# Patient Record
Sex: MALE | Race: WHITE | NOT HISPANIC OR LATINO | Employment: OTHER | URBAN - METROPOLITAN AREA
[De-identification: names, ages, dates, MRNs, and addresses within clinical notes are randomized per-mention and may not be internally consistent; named-entity substitution may affect disease eponyms.]

---

## 2021-10-30 RX ORDER — CETIRIZINE HYDROCHLORIDE 10 MG/1
TABLET ORAL
Qty: 90 TABLET | Refills: 1 | Status: SHIPPED | OUTPATIENT
Start: 2021-10-30

## 2021-10-30 RX ORDER — BLOOD SUGAR DIAGNOSTIC
STRIP MISCELLANEOUS
Qty: 200 EACH | Refills: 2 | Status: SHIPPED | OUTPATIENT
Start: 2021-10-30

## 2022-06-01 RX ORDER — AMIODARONE HYDROCHLORIDE 200 MG/1
TABLET ORAL
Qty: 90 TABLET | OUTPATIENT
Start: 2022-06-01

## 2022-11-22 ENCOUNTER — TELEPHONE (OUTPATIENT)
Dept: RADIATION ONCOLOGY | Facility: HOSPITAL | Age: 87
End: 2022-11-22

## 2022-11-28 ENCOUNTER — CONSULT (OUTPATIENT)
Dept: ONCOLOGY | Facility: CLINIC | Age: 87
End: 2022-11-28

## 2022-11-28 VITALS
TEMPERATURE: 96.9 F | HEART RATE: 112 BPM | BODY MASS INDEX: 29.4 KG/M2 | SYSTOLIC BLOOD PRESSURE: 128 MMHG | HEIGHT: 71 IN | WEIGHT: 210 LBS | DIASTOLIC BLOOD PRESSURE: 79 MMHG | OXYGEN SATURATION: 93 % | RESPIRATION RATE: 18 BRPM

## 2022-11-28 DIAGNOSIS — C44.1192 BASAL CELL CARCINOMA (BCC) OF LEFT LOWER EYELID: Primary | ICD-10-CM

## 2022-11-28 PROCEDURE — 99204 OFFICE O/P NEW MOD 45 MIN: CPT | Performed by: INTERNAL MEDICINE

## 2022-11-28 RX ORDER — TORSEMIDE 20 MG/1
TABLET ORAL
COMMUNITY

## 2022-11-28 RX ORDER — FLUOROMETHOLONE 0.1 %
SUSPENSION, DROPS(FINAL DOSAGE FORM)(ML) OPHTHALMIC (EYE)
COMMUNITY

## 2022-11-28 RX ORDER — CETIRIZINE HYDROCHLORIDE 10 MG/1
TABLET ORAL
COMMUNITY
End: 2022-11-30 | Stop reason: SDUPTHER

## 2022-11-28 RX ORDER — LISINOPRIL 10 MG/1
TABLET ORAL
COMMUNITY
Start: 2022-10-03

## 2022-11-28 RX ORDER — DOXYCYCLINE HYCLATE 100 MG/1
CAPSULE ORAL
COMMUNITY
Start: 2022-11-18 | End: 2022-11-30

## 2022-11-28 RX ORDER — FLUTICASONE PROPIONATE 50 MCG
SPRAY, SUSPENSION (ML) NASAL
COMMUNITY

## 2022-11-28 RX ORDER — LEVOTHYROXINE SODIUM 0.03 MG/1
TABLET ORAL
COMMUNITY
Start: 2022-09-27

## 2022-11-28 RX ORDER — APIXABAN 2.5 MG/1
TABLET, FILM COATED ORAL
COMMUNITY
Start: 2022-09-27

## 2022-11-28 RX ORDER — OMEPRAZOLE 20 MG/1
CAPSULE, DELAYED RELEASE ORAL
COMMUNITY
Start: 2022-11-22

## 2022-11-28 RX ORDER — CLINDAMYCIN HYDROCHLORIDE 150 MG/1
CAPSULE ORAL
COMMUNITY
End: 2022-11-30

## 2022-11-28 RX ORDER — ISOPROPYL ALCOHOL 70 ML/100ML
SWAB TOPICAL
COMMUNITY
Start: 2022-10-11

## 2022-11-28 RX ORDER — SACUBITRIL AND VALSARTAN 24; 26 MG/1; MG/1
TABLET, FILM COATED ORAL
COMMUNITY

## 2022-11-28 RX ORDER — FLUOROURACIL 50 MG/G
CREAM TOPICAL
COMMUNITY

## 2022-11-28 RX ORDER — CEPHALEXIN 500 MG/1
CAPSULE ORAL
COMMUNITY

## 2022-11-28 RX ORDER — ALBUTEROL SULFATE 90 UG/1
AEROSOL, METERED RESPIRATORY (INHALATION)
COMMUNITY
Start: 2022-11-18

## 2022-11-28 RX ORDER — SIMVASTATIN 40 MG
TABLET ORAL
COMMUNITY
Start: 2022-10-04

## 2022-11-28 RX ORDER — POLYETHYLENE GLYCOL 3350, SODIUM CHLORIDE, SODIUM BICARBONATE, POTASSIUM CHLORIDE 420; 11.2; 5.72; 1.48 G/4L; G/4L; G/4L; G/4L
POWDER, FOR SOLUTION ORAL
COMMUNITY

## 2022-11-28 RX ORDER — CLOCORTOLONE PIVALATE 0 G/G
CREAM TOPICAL
COMMUNITY

## 2022-11-28 RX ORDER — CARVEDILOL 25 MG/1
12.5 TABLET ORAL 2 TIMES DAILY WITH MEALS
COMMUNITY
Start: 2022-10-13

## 2022-11-28 RX ORDER — CEFDINIR 300 MG/1
CAPSULE ORAL
COMMUNITY
Start: 2022-11-22

## 2022-11-28 RX ORDER — BIMATOPROST 0.01 %
DROPS OPHTHALMIC (EYE)
COMMUNITY

## 2022-11-28 RX ORDER — MOLNUPIRAVIR 200 MG/1
CAPSULE ORAL
COMMUNITY

## 2022-11-28 RX ORDER — LEVOTHYROXINE SODIUM 0.2 MG/1
TABLET ORAL
COMMUNITY
Start: 2022-11-15

## 2022-11-28 RX ORDER — HYDROCODONE BITARTRATE AND ACETAMINOPHEN 5; 325 MG/1; MG/1
TABLET ORAL
COMMUNITY
Start: 2022-09-13

## 2022-11-28 RX ORDER — BUDESONIDE AND FORMOTEROL FUMARATE DIHYDRATE 160; 4.5 UG/1; UG/1
AEROSOL RESPIRATORY (INHALATION)
COMMUNITY
Start: 2022-09-27

## 2022-11-28 RX ORDER — LORATADINE 10 MG/1
TABLET ORAL
COMMUNITY

## 2022-11-28 RX ORDER — OFLOXACIN 3 MG/ML
SOLUTION/ DROPS OPHTHALMIC
COMMUNITY

## 2022-11-28 RX ORDER — AMIODARONE HYDROCHLORIDE 200 MG/1
TABLET ORAL
COMMUNITY
Start: 2022-10-31

## 2022-11-28 RX ORDER — GLIMEPIRIDE 2 MG/1
TABLET ORAL
COMMUNITY
Start: 2022-08-29

## 2022-11-28 RX ORDER — GLIPIZIDE 5 MG/1
TABLET ORAL
COMMUNITY
Start: 2022-10-21

## 2022-11-28 RX ORDER — FUROSEMIDE 40 MG/1
TABLET ORAL
COMMUNITY
Start: 2022-11-15

## 2022-11-28 RX ORDER — DIFLUPREDNATE OPHTHALMIC 0.5 MG/ML
EMULSION OPHTHALMIC
COMMUNITY

## 2022-11-28 RX ORDER — LEVOTHYROXINE SODIUM 175 UG/1
TABLET ORAL
COMMUNITY
Start: 2022-11-21 | End: 2022-11-30

## 2022-11-28 RX ORDER — POTASSIUM CHLORIDE 750 MG/1
TABLET, EXTENDED RELEASE ORAL
COMMUNITY
Start: 2022-10-11

## 2022-11-28 NOTE — PROGRESS NOTES
DATE OF CONSULTATION: 11/28/2022    REFERRING PHYSICIAN: Hazel Marinelli MD    Dear Hazel Villarreal MD  Thank you for asking for my medical advice on this patient. I saw him in the  [place] office on 11/28/2022    REASON FOR CONSULTATION: Skin cancers    PROBLEM LIST:   1.  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  Basal cell carcinoma left lower eyelid,  A.  Status post biopsy September 2022  3.  History of prostate cancer:  A.  Treated with pelvic radiation  4.  Remote history of thyroid cancer:  A.  Treated with thyroidectomy followed by radioactive iodine at [facility]  B.  On chronic Synthroid replacement  5.  Atrial fibrillation:  A.  Status post pacemaker  B.  On amiodarone    HISTORY OF PRESENT ILLNESS: The patient is a very pleasant 87 y.o.  male   who was in his usual state of health until September 2022.  The patient presented with a growing lesion left lower eyelid.  He had a biopsy done that confirmed basal cell carcinoma.  The patient had previously squamous cell carcinoma that was removed from left ear.  He has met with Dr. Troy in radiation oncology who recommended 30 to radiation treatments to left side of face.  He was referred to me for further recommendations.    SUBJECTIVE: When I saw the patient today he is here with his wife.  The left lower eyelid lesion had decreased significantly in size since the biopsy.  He is anxious about today's visit.    Review of Systems   Constitutional: Positive for fatigue.   Respiratory: Negative.    Cardiovascular: Negative.    Gastrointestinal: Negative.    Genitourinary: Negative.    Skin: Positive for rash.   Neurological: Negative.    Hematological: Negative.    Psychiatric/Behavioral: The patient is nervous/anxious.        No past medical history on file.    Social History     Socioeconomic History   • Marital status:        No family history on file.    No past surgical history on file.    Allergies    Allergen Reactions   • Penicillins Unknown - Low Severity          Current Outpatient Medications:   •  Accu-Chek Guide test strip, TEST BLOOD SUGAR TWICE DAILY, Disp: 200 each, Rfl: 2  •  albuterol sulfate  (90 Base) MCG/ACT inhaler, , Disp: , Rfl:   •  Alcohol Swabs (DropSafe Alcohol Prep) 70 % pads, , Disp: , Rfl:   •  amiodarone (PACERONE) 200 MG tablet, , Disp: , Rfl:   •  bimatoprost (Lumigan) 0.01 % ophthalmic drops, Lumigan 0.01 % eye drops, Disp: , Rfl:   •  carvedilol (COREG) 25 MG tablet, , Disp: , Rfl:   •  cefdinir (OMNICEF) 300 MG capsule, , Disp: , Rfl:   •  cephalexin (KEFLEX) 500 MG capsule, cephalexin 500 mg capsule, Disp: , Rfl:   •  cetirizine (zyrTEC) 10 MG tablet, TAKE 1 TABLET EVERY DAY, Disp: 90 tablet, Rfl: 1  •  cetirizine (zyrTEC) 10 MG tablet, cetirizine 10 mg tablet  Take 1 tablet every day by oral route., Disp: , Rfl:   •  clindamycin (CLEOCIN) 150 MG capsule, clindamycin HCl 150 mg capsule, Disp: , Rfl:   •  Clocortolone Pivalate 0.1 % cream, clocortolone pivalate 0.1 % topical cream, Disp: , Rfl:   •  Diclofenac Sodium (Voltaren) 1 % gel gel, Voltaren 1 % topical gel, Disp: , Rfl:   •  difluprednate (Durezol) 0.05 % ophthalmic emulsion, Durezol 0.05 % eye drops, Disp: , Rfl:   •  doxycycline (VIBRAMYCIN) 100 MG capsule, , Disp: , Rfl:   •  Eliquis 2.5 MG tablet tablet, , Disp: , Rfl:   •  fluorometholone (FML) 0.1 % ophthalmic suspension, fluorometholone 0.1 % eye drops,suspension, Disp: , Rfl:   •  fluorouracil (EFUDEX) 5 % cream, fluorouracil 5 % topical cream, Disp: , Rfl:   •  fluticasone (FLONASE) 50 MCG/ACT nasal spray, fluticasone propionate 50 mcg/actuation nasal spray,suspension, Disp: , Rfl:   •  furosemide (LASIX) 40 MG tablet, , Disp: , Rfl:   •  glimepiride (AMARYL) 2 MG tablet, , Disp: , Rfl:   •  glipizide (GLUCOTROL) 5 MG tablet, , Disp: , Rfl:   •  HYDROcodone-acetaminophen (NORCO) 5-325 MG per tablet, , Disp: , Rfl:   •  levothyroxine (SYNTHROID, LEVOTHROID)  "175 MCG tablet, , Disp: , Rfl:   •  levothyroxine (SYNTHROID, LEVOTHROID) 200 MCG tablet, , Disp: , Rfl:   •  levothyroxine (SYNTHROID, LEVOTHROID) 25 MCG tablet, , Disp: , Rfl:   •  lisinopril (PRINIVIL,ZESTRIL) 10 MG tablet, , Disp: , Rfl:   •  loratadine (Claritin) 10 MG tablet, Claritin 10 mg tablet  Take 1 tablet every day by oral route as needed., Disp: , Rfl:   •  metFORMIN (GLUCOPHAGE) 1000 MG tablet, , Disp: , Rfl:   •  Molnupiravir (Lagevrio) 200 MG capsule, Lagevrio 200 mg capsule (EUA), Disp: , Rfl:   •  mupirocin (BACTROBAN) 2 % ointment, , Disp: , Rfl:   •  ofloxacin (OCUFLOX) 0.3 % ophthalmic solution, ofloxacin 0.3 % eye drops, Disp: , Rfl:   •  omeprazole (priLOSEC) 20 MG capsule, , Disp: , Rfl:   •  polyethylene glycol-electrolytes (TriLyte) 420 g solution, TriLyte With Flavor Packets 420 gram oral solution, Disp: , Rfl:   •  potassium chloride (K-DUR,KLOR-CON) 10 MEQ CR tablet, , Disp: , Rfl:   •  sacubitril-valsartan (Entresto) 24-26 MG tablet, Entresto 24 mg-26 mg tablet, Disp: , Rfl:   •  simvastatin (ZOCOR) 40 MG tablet, , Disp: , Rfl:   •  Symbicort 160-4.5 MCG/ACT inhaler, , Disp: , Rfl:   •  torsemide (DEMADEX) 20 MG tablet, torsemide 20 mg tablet, Disp: , Rfl:     PHYSICAL EXAMINATION:   /79   Pulse 112   Temp 96.9 °F (36.1 °C) (Temporal)   Resp 18   Ht 180.3 cm (71\")   Wt 95.3 kg (210 lb)   SpO2 93%   BMI 29.29 kg/m²   Pain Score    11/28/22 1053   PainSc: 0-No pain      ECOG score: 2            ECOG Performance Status: 1 - Symptomatic but completely ambulatory  General Appearance:  alert, cooperative, no apparent distress and appears stated age   Neurologic/Psychiatric: A&O x 3, gait steady, appropriate affect, strength 5/5 in all muscle groups   HEENT:  Normocephalic, without obvious abnormality, mucous membranes moist   Neck: Supple, symmetrical, trachea midline, no adenopathy;  No thyromegaly, masses, or tenderness   Lungs:   Clear to auscultation bilaterally; " respirations regular, even, and unlabored bilaterally   Heart:  Regular rate and rhythm, no murmurs appreciated   Abdomen:   Soft, non-tender, non-distended and no organomegaly   Lymph nodes: No cervical, supraclavicular, inguinal or axillary adenopathy noted   Extremities: Normal, atraumatic; no clubbing, cyanosis, or edema    Skin: No rashes, ulcers, or suspicious lesions noted       No visits with results within 2 Week(s) from this visit.   Latest known visit with results is:   No results found for any previous visit.        No results found.      DIAGNOSTIC DATA:   1.  Extensive patient medical records including doctor notes blood work results and imaging report reviewed by me and document the patient's chart.    ASSESSMENT: The patient is a very pleasant 87 y.o.  male  with skin cancers    PLAN:     1.  Skin cancers:  A.  I had a long discussion today with the patient and his wife about his  new diagnosis of skin cancers. I did go over the final pathology report in  detail with both of them. I reviewed the patient's documents including refereing provider's notes, lab results, imaging, and path report.  B.  I explained to the patient that I am really not that impressed with his current residual disease.  His squamous cell carcinoma has been removed from the left ear.  He still has evidence of disease in the left lower eyelid biopsy-proven basal cell carcinoma but this area is fairly small currently.  C.  I recommend to proceed with definitive radiotherapy to the left lower eyelid lesion with focal and targeted treatment.  I do think patient needs to have radiation to the whole left side of face.  D.  I discussed the case with my radiation oncology colleague Dr. Day who kindly agreed see the patient on Wednesday.  E.  Future treatment options might include immunotherapy with Libtayo 350 mg IV every 3 weeks if he has evidence of recurrent disease that is not amenable to resection or radiation.  F.  I did go  over his CT neck and reassured him no evidence of metastases.  G.  He will follow-up with me in 3 months.  H.  He will continue routine follow-up visit with dermatology.    2.  Atrial fibrillation:  A.  The patient status post pacemaker placement.  He is also on amiodarone.    FOLLOW UP: 3 months.    Aleksandra Rivera MD  11/28/2022

## 2022-11-29 ENCOUNTER — TELEPHONE (OUTPATIENT)
Dept: RADIATION ONCOLOGY | Facility: HOSPITAL | Age: 87
End: 2022-11-29

## 2022-11-30 ENCOUNTER — OFFICE VISIT (OUTPATIENT)
Dept: RADIATION ONCOLOGY | Facility: HOSPITAL | Age: 87
End: 2022-11-30

## 2022-11-30 ENCOUNTER — HOSPITAL ENCOUNTER (OUTPATIENT)
Dept: RADIATION ONCOLOGY | Facility: HOSPITAL | Age: 87
Setting detail: RADIATION/ONCOLOGY SERIES
Discharge: HOME | End: 2022-11-30

## 2022-11-30 VITALS
HEART RATE: 93 BPM | HEIGHT: 71 IN | RESPIRATION RATE: 18 BRPM | TEMPERATURE: 96.8 F | DIASTOLIC BLOOD PRESSURE: 89 MMHG | BODY MASS INDEX: 29.82 KG/M2 | WEIGHT: 213 LBS | OXYGEN SATURATION: 92 % | SYSTOLIC BLOOD PRESSURE: 143 MMHG

## 2022-11-30 DIAGNOSIS — C44.1292 SQUAMOUS CELL CARCINOMA (SCC) OF LOWER EYELID OF LEFT EYE: Primary | ICD-10-CM

## 2022-11-30 PROBLEM — C44.229: Status: ACTIVE | Noted: 2022-11-30

## 2022-11-30 PROBLEM — C44.92 SQUAMOUS CELL CARCINOMA OF SKIN: Status: ACTIVE | Noted: 2022-11-30

## 2022-11-30 PROBLEM — C44.1192 BASAL CELL CARCINOMA (BCC) OF LEFT LOWER EYELID: Status: RESOLVED | Noted: 2022-11-28 | Resolved: 2022-11-30

## 2022-11-30 PROBLEM — C44.92 SQUAMOUS CELL CARCINOMA OF SKIN: Status: RESOLVED | Noted: 2022-11-30 | Resolved: 2022-11-30

## 2022-11-30 PROCEDURE — G0463 HOSPITAL OUTPT CLINIC VISIT: HCPCS

## 2022-11-30 NOTE — PROGRESS NOTES
CONSULTATION NOTE      :                                                          1935  DATE OF CONSULTATION:                       2022   REQUESTING PHYSICIAN:                   Rachel Kaur MD  REASON FOR CONSULTATION:             Squamous cell carcinoma (BCC) of left lower eyelid  - Stage IA (cT1, cN0, cM0)    Squamous Cell Carcinoma, left lower earlobe  - Stage T3N0M0     BRIEF HISTORY:  The patient is a very pleasant 87 y.o. male  with a past medical history significant for coronary artery disease as well as a remote history of prostate cancer treated with primary radiation, as well as multiple cutaneous skin cancers.  He recently presented with an enlarging mass involving the inferior aspect of his left earlobe as well as skin below his ear.  He had a two-stage Mohs resection which identified an approximate 4 cm poorly differentiated squamous cell carcinoma that had evidence of perineural invasion involving a nerve that measured 0.04 mm within the dermis.  There was perineural invasion.  Margins were negative after 2 stages, and he was able to have a Mercedes flap reconstruction.  In addition, he has 2 more cutaneous lesions that he reports involving left inferior eyelid and the right upper lip.  He was referred by Dr. Kaur for adjuvant radiation therapy.  He was initially seen by Dr. Troy at [facility] radiation oncology, who recommended a protracted treatment course of 38 fractions.  He was also seen by Dr. Rivera who did not recommend any systemic therapy. I am seeing him today specifically to discuss adjuvant radiation therapy as a second opinion.    Allergy:   Allergies   Allergen Reactions   • Penicillins Hives       Social History:   Social History     Socioeconomic History   • Marital status:    Tobacco Use   • Smoking status: Former     Types: Cigarettes   • Tobacco comments:     Quit age 26   Substance and Sexual Activity   • Alcohol use: Not Currently   • Drug  "use: Never       Past Medical History:   Past Medical History:   Diagnosis Date   • CAD (coronary artery disease)     3 stents   • Cardiac arrest (HCC)    • Hypertension    • Pacemaker    • Prostate cancer (HCC)    • Skin cancer of face    • Thyroid cancer (HCC)        Family History: family history includes Breast cancer in his sister; Colon cancer in his mother; Heart disease in his father.     Surgical History:   Past Surgical History:   Procedure Laterality Date   • CARDIAC VALVE REPLACEMENT      porcine   • CORONARY ARTERY BYPASS GRAFT      X 2   • OTHER SURGICAL HISTORY      bladder tumor removed   • STOMACH SURGERY      bleed-ulcer   • THYROIDECTOMY          Review of Systems:   Review of Systems   Respiratory: Positive for shortness of breath (with exertion).    All other systems reviewed and are negative.          Objective   VITAL SIGNS:   Vitals:    11/30/22 1421   BP: 143/89   Pulse: 93   Resp: 18   Temp: 96.8 °F (36 °C)   TempSrc: Temporal   SpO2: 92%   Weight: 96.6 kg (213 lb)   Height: 180.3 cm (71\")   PainSc: 0-No pain        Karnofsky score: 80       Physical Exam:   Physical Exam  Vitals and nursing note reviewed.   Constitutional:       General: He is not in acute distress.     Appearance: He is well-developed.   HENT:      Head: Normocephalic and atraumatic.      Comments: Extensive cutaneous changes consistent with sun damaged throughout the scalp and face.  There are multiple previous excision sites he has a 5 x 7 mm erythematous plaque-like lesion in the left inferior lateral aspect of the periocular fat, consistent with a cutaneous skin cancer this has previously been biopsied as a squamous cell carcinoma.    There is no current ulceration.      There is an additional 4x6mm elliptical erythematous plaque on the right upper lip consistent with a squamous cell carcinoma.    There is an elliptical incision below and anterior to the left ear lobe that is well healed.  There is no abnormal " nodularity or adenopathy.  Parotid is soft and nontender.  No evidence of pre- or post- auricular adenopathy.  See images below consisting of the patient's personal image on the left that was immediately after Moh's resection, and then the image on the right is today.  The lesion under the left eyelid is indicated by the arrow.  Eyes:      Conjunctiva/sclera: Conjunctivae normal.      Pupils: Pupils are equal, round, and reactive to light.   Cardiovascular:      Rate and Rhythm: Normal rate and regular rhythm.      Heart sounds: No murmur heard.    No friction rub.   Pulmonary:      Effort: Pulmonary effort is normal.      Breath sounds: Normal breath sounds. No wheezing.   Abdominal:      General: Bowel sounds are normal. There is no distension.      Palpations: Abdomen is soft. There is no mass.      Tenderness: There is no abdominal tenderness.   Musculoskeletal:         General: Normal range of motion.      Cervical back: Normal range of motion and neck supple.   Lymphadenopathy:      Cervical: No cervical adenopathy.   Skin:     General: Skin is warm and dry.   Neurological:      Mental Status: He is alert and oriented to person, place, and time.   Psychiatric:         Behavior: Behavior normal.         Thought Content: Thought content normal.         Judgment: Judgment normal.            PATHOLOGY  Left Ear Resection 9/13/2022:  Poorly differentiated squamous cell carcinoma, defect 4x3 cm, perineural invasion involving a 0.04mm nerve in the dermis.    IMAGING  I have personally reviewed the relevant imaging studies, as follows:  Ct Neck 10/5/2022:  There are no masses or adenopathy in the neck.  The thyroid is not identified.  Is status post median sternotomy.  Marked degenerative disc disease.       The following portions of the patient's history were reviewed and updated as appropriate: allergies, current medications, past family history, past medical history, past social history, past surgical history and  problem list.    Assessment:   Assessment  Mr. Frank is an 87-year-old gentleman with a longstanding history of multiple cutaneous skin cancers, who now presents essentially for 2 separate skin cancers involving the left lower eyelid as well as the left ear.  He was able to undergo Mohs resection to the left earlobe, but unfortunately this has multiple high risk features including poorly differentiated histology, size greater than 2 cm, and perineural invasion with a measurable nerve within the adipose tissue, all of which will place him at significant risk for recurrence.  He will certainly require adjuvant radiation therapy to the resection bed, and I think we can also treat the left lower eyelid lesion from concurrently.  He was very interested in pursuing a hypofractionated treatment course, which I think we can certainly accomplish.  My plan will be to treat him using superficial electrons to approximately dose of 40-50 Gray over 4 weeks.  I will treat him in 2-3 fractions a week which should be able to minimize his risk for toxicity and also help limit the total requirements for transportation.  He was very pleased with this recommendation. I will coordinate for him to return to my clinic next week for a radiation setup and simulation session and we will likely begin treatments shortly thereafter pending insurance authorization.    RECOMMENDATIONS:    1. Plan to treat the left ear resection bed to 40-50 Gy over 12-15 treatments, with additional coverage of the left lower eyelid lesion concurrently    I spent a total of 60 minutes on todays visit, with more than 45 minutes in direct face to face communication, and the remainder of the time spent in reviewing the relevant history, records, available imaging, and for documentation.    Follow Up:   Return in about 1 week (around 12/7/2022) for Radiation Simulation.  Diagnoses and all orders for this visit:    1. Squamous cell carcinoma (SCC) of lower eyelid of  left eye (Primary)    Thank you for allowing me to participate in the care of this individual.    Sincerely,       Aaron Day MD     ADDENDUM 3/7/2023:  Special dosimetry using nanodots will be necessary during the treatment planning process for Mr. Frnak.  As can be seen in the images above, there are actually 2 lesions on his left face, which are in very close proximity to the left eye and orbit.  Nanodots will be used in this case to verify the treatment dose under the bolus, and also to determine that there is appropriate shielding in terms of protecting his eye.  This will not be able to be satisfactorily completed using the treatment planning system alone, so nanodots will be ordered on the first day of treatment.

## 2022-12-07 ENCOUNTER — HOSPITAL ENCOUNTER (OUTPATIENT)
Dept: RADIATION ONCOLOGY | Facility: HOSPITAL | Age: 87
Discharge: HOME | End: 2022-12-07

## 2022-12-07 ENCOUNTER — HOSPITAL ENCOUNTER (OUTPATIENT)
Dept: RADIATION ONCOLOGY | Facility: HOSPITAL | Age: 87
Setting detail: RADIATION/ONCOLOGY SERIES
Discharge: HOME | End: 2022-12-07
Payer: MEDICARE

## 2022-12-07 PROCEDURE — 77470 SPECIAL RADIATION TREATMENT: CPT | Performed by: RADIOLOGY

## 2022-12-07 PROCEDURE — 77334 RADIATION TREATMENT AID(S): CPT | Performed by: RADIOLOGY

## 2022-12-07 PROCEDURE — 77290 THER RAD SIMULAJ FIELD CPLX: CPT | Performed by: RADIOLOGY

## 2022-12-14 PROCEDURE — 77307 TELETHX ISODOSE PLAN CPLX: CPT | Performed by: RADIOLOGY

## 2022-12-16 ENCOUNTER — TELEPHONE (OUTPATIENT)
Dept: RADIATION ONCOLOGY | Facility: HOSPITAL | Age: 87
End: 2022-12-16

## 2022-12-16 PROCEDURE — 77321 SPECIAL TELETX PORT PLAN: CPT | Performed by: RADIOLOGY

## 2022-12-16 NOTE — TELEPHONE ENCOUNTER
Contacted [facility] PPM repLola Gtz to set up PPM interrogation prior to initial radiation treatment per pt cardiologist request ( Dr. Goel).Pt notified to arrive in rad onc Monday 12/19/22 @ 0900 to have PPM checked prior to treatment-verbalized understanding

## 2022-12-19 ENCOUNTER — HOSPITAL ENCOUNTER (OUTPATIENT)
Dept: RADIATION ONCOLOGY | Facility: HOSPITAL | Age: 87
Discharge: HOME | End: 2022-12-19

## 2022-12-19 PROCEDURE — 77290 THER RAD SIMULAJ FIELD CPLX: CPT | Performed by: RADIOLOGY

## 2022-12-19 PROCEDURE — 77332 RADIATION TREATMENT AID(S): CPT | Performed by: RADIOLOGY

## 2022-12-19 PROCEDURE — 77334 RADIATION TREATMENT AID(S): CPT | Performed by: RADIOLOGY

## 2022-12-19 PROCEDURE — 77412 RADIATION TX DELIVERY LVL 3: CPT | Performed by: RADIOLOGY

## 2022-12-21 ENCOUNTER — HOSPITAL ENCOUNTER (OUTPATIENT)
Dept: RADIATION ONCOLOGY | Facility: HOSPITAL | Age: 87
Discharge: HOME | End: 2022-12-21

## 2022-12-21 VITALS — BODY MASS INDEX: 29.71 KG/M2 | WEIGHT: 213 LBS

## 2022-12-21 PROCEDURE — 77412 RADIATION TX DELIVERY LVL 3: CPT | Performed by: RADIOLOGY

## 2022-12-22 PROCEDURE — 77331 SPECIAL RADIATION DOSIMETRY: CPT | Performed by: RADIOLOGY

## 2022-12-22 PROCEDURE — 77336 RADIATION PHYSICS CONSULT: CPT | Performed by: RADIOLOGY

## 2022-12-27 ENCOUNTER — HOSPITAL ENCOUNTER (OUTPATIENT)
Dept: RADIATION ONCOLOGY | Facility: HOSPITAL | Age: 87
Discharge: HOME | End: 2022-12-27

## 2022-12-27 VITALS — BODY MASS INDEX: 30.21 KG/M2 | WEIGHT: 216.6 LBS

## 2022-12-27 PROCEDURE — 77412 RADIATION TX DELIVERY LVL 3: CPT | Performed by: RADIOLOGY

## 2022-12-28 ENCOUNTER — HOSPITAL ENCOUNTER (OUTPATIENT)
Dept: RADIATION ONCOLOGY | Facility: HOSPITAL | Age: 87
Discharge: HOME | End: 2022-12-28

## 2022-12-28 PROCEDURE — 77412 RADIATION TX DELIVERY LVL 3: CPT | Performed by: RADIOLOGY

## 2022-12-30 ENCOUNTER — HOSPITAL ENCOUNTER (OUTPATIENT)
Dept: RADIATION ONCOLOGY | Facility: HOSPITAL | Age: 87
Discharge: HOME | End: 2022-12-30

## 2022-12-30 PROCEDURE — 77412 RADIATION TX DELIVERY LVL 3: CPT | Performed by: RADIOLOGY

## 2023-01-03 ENCOUNTER — HOSPITAL ENCOUNTER (OUTPATIENT)
Dept: RADIATION ONCOLOGY | Facility: HOSPITAL | Age: 88
Setting detail: RADIATION/ONCOLOGY SERIES
Discharge: HOME | End: 2023-01-03
Payer: MEDICARE

## 2023-01-03 ENCOUNTER — HOSPITAL ENCOUNTER (OUTPATIENT)
Dept: RADIATION ONCOLOGY | Facility: HOSPITAL | Age: 88
Discharge: HOME | End: 2023-01-03
Payer: MEDICARE

## 2023-01-03 VITALS — WEIGHT: 211.9 LBS | BODY MASS INDEX: 29.55 KG/M2

## 2023-01-03 PROCEDURE — 77412 RADIATION TX DELIVERY LVL 3: CPT | Performed by: RADIOLOGY

## 2023-01-04 ENCOUNTER — HOSPITAL ENCOUNTER (OUTPATIENT)
Dept: RADIATION ONCOLOGY | Facility: HOSPITAL | Age: 88
Discharge: HOME | End: 2023-01-04
Payer: MEDICARE

## 2023-01-04 PROCEDURE — 77412 RADIATION TX DELIVERY LVL 3: CPT | Performed by: RADIOLOGY

## 2023-01-05 PROCEDURE — 77336 RADIATION PHYSICS CONSULT: CPT | Performed by: RADIOLOGY

## 2023-01-06 ENCOUNTER — HOSPITAL ENCOUNTER (OUTPATIENT)
Dept: RADIATION ONCOLOGY | Facility: HOSPITAL | Age: 88
Discharge: HOME | End: 2023-01-06

## 2023-01-06 PROCEDURE — 77412 RADIATION TX DELIVERY LVL 3: CPT | Performed by: RADIOLOGY

## 2023-01-09 ENCOUNTER — HOSPITAL ENCOUNTER (OUTPATIENT)
Dept: RADIATION ONCOLOGY | Facility: HOSPITAL | Age: 88
Discharge: HOME | End: 2023-01-09

## 2023-01-09 PROCEDURE — 77412 RADIATION TX DELIVERY LVL 3: CPT | Performed by: RADIOLOGY

## 2023-01-11 ENCOUNTER — HOSPITAL ENCOUNTER (OUTPATIENT)
Dept: RADIATION ONCOLOGY | Facility: HOSPITAL | Age: 88
Discharge: HOME | End: 2023-01-11

## 2023-01-11 VITALS — BODY MASS INDEX: 29.57 KG/M2 | WEIGHT: 212 LBS

## 2023-01-11 PROCEDURE — 77412 RADIATION TX DELIVERY LVL 3: CPT | Performed by: RADIOLOGY

## 2023-01-13 ENCOUNTER — HOSPITAL ENCOUNTER (OUTPATIENT)
Dept: RADIATION ONCOLOGY | Facility: HOSPITAL | Age: 88
Discharge: HOME | End: 2023-01-13

## 2023-01-13 PROCEDURE — 77412 RADIATION TX DELIVERY LVL 3: CPT | Performed by: RADIOLOGY

## 2023-01-16 ENCOUNTER — HOSPITAL ENCOUNTER (OUTPATIENT)
Dept: RADIATION ONCOLOGY | Facility: HOSPITAL | Age: 88
Discharge: HOME | End: 2023-01-16

## 2023-01-16 DIAGNOSIS — C44.229: ICD-10-CM

## 2023-01-16 DIAGNOSIS — C44.1292 SQUAMOUS CELL CARCINOMA (SCC) OF LOWER EYELID OF LEFT EYE: Primary | ICD-10-CM

## 2023-01-16 PROCEDURE — 77412 RADIATION TX DELIVERY LVL 3: CPT | Performed by: RADIOLOGY

## 2023-01-20 NOTE — RADIATION COMPLETION NOTES
DATE OF COMPLETION: 1/16/2023  DIAGNOSIS: Squamous Cell Carcinoma of the left earlobe  Staging: T3N0    REFERRING: Rachel Kaur MD        David Chavez Jr. completed radiation therapy today.      BACKGROUND: David Frank Jr. is an 87-year-old gentleman who was found to have a bulky invasive squamous cell carcinoma involving the left earlobe and adjacent skin.  He underwent Mohs resection who had high risk features including size greater than 2 cm, histology, as well as perineural invasion of the involved nerve.  He received adjuvant radiation therapy as detailed below:    Treatment Summary     Dates of Therapy: 12/27/2022-1/16/2023  Treatment Site: Left lower ear and adjacent preauricular, postauricular, and upper neck skin  Dose: 39 Gy in 12 fractions of 325 cGy each  Technique: En face electrons using 6 MV energy were utilized to treat the operative bed and adjacent lymphatics    Treatment Course and Tolerance: He tolerated radiation treatments well.  He did develop grade 1 skin erythema that was managed with topical skin creams.  He otherwise did not develop any significant radiation related side effects.    The initial follow up visit will be in 1 month.    David Frank Jr. knows to call if any problems or concerns develop in the meantime.     Electronically signed by: Aaron Day MD                    Cc: Hazel Marinelli MD

## 2023-02-20 ENCOUNTER — HOSPITAL ENCOUNTER (OUTPATIENT)
Dept: RADIATION ONCOLOGY | Facility: HOSPITAL | Age: 88
Setting detail: RADIATION/ONCOLOGY SERIES
Discharge: HOME | End: 2023-02-20
Payer: MEDICARE

## 2024-05-04 DIAGNOSIS — G89.29 OTHER CHRONIC PAIN: Primary | ICD-10-CM

## 2024-05-04 RX ORDER — OXYCODONE HYDROCHLORIDE 5 MG/1
5 TABLET ORAL EVERY 6 HOURS PRN
Qty: 120 TABLET | Refills: 0 | Status: SHIPPED | OUTPATIENT
Start: 2024-05-04 | End: 2024-06-03

## 2024-05-24 ENCOUNTER — HOSPITAL ENCOUNTER (OUTPATIENT)
Dept: CT IMAGING | Facility: HOSPITAL | Age: 89
Discharge: HOME | End: 2024-05-24
Payer: MEDICARE

## 2024-05-24 DIAGNOSIS — H91.90 HEARING DISORDER, UNSPECIFIED LATERALITY: ICD-10-CM

## 2024-05-24 PROCEDURE — 70450 CT HEAD/BRAIN W/O DYE: CPT

## 2024-05-31 ENCOUNTER — OUTSIDE SERVICES (OUTPATIENT)
Dept: FAMILY MEDICINE CLINIC | Age: 89
End: 2024-05-31

## 2024-05-31 DIAGNOSIS — R53.1 WEAKNESS: ICD-10-CM

## 2024-05-31 DIAGNOSIS — R29.898 RIGHT LEG WEAKNESS: ICD-10-CM

## 2024-05-31 DIAGNOSIS — I10 HYPERTENSION, UNSPECIFIED TYPE: ICD-10-CM

## 2024-05-31 DIAGNOSIS — F41.9 ANXIETY: ICD-10-CM

## 2024-05-31 DIAGNOSIS — R60.9 EDEMA, UNSPECIFIED TYPE: ICD-10-CM

## 2024-05-31 DIAGNOSIS — G81.94 LEFT HEMIPARESIS (HCC): Primary | ICD-10-CM

## 2024-05-31 DIAGNOSIS — Z95.0 PACEMAKER: ICD-10-CM

## 2024-05-31 ASSESSMENT — ENCOUNTER SYMPTOMS: BACK PAIN: 1

## 2024-05-31 NOTE — PROGRESS NOTES
SUBJECTIVE:    Patient ID: Robel Newell is a 88 y.o. male.    Chief Complaint   Patient presents with    Extremity Weakness       HPI: nursing home admission  Nursing home admission due to weakness.  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  He does have some swelling in his left leg though it is not significant compared to his left arm.  He is having some weeping occasionally of that arm.  He is having some relatively newer right leg difficulties.  His wife says that he is having some weakness there which she was not having prior to being hospitalized this last time.  He is having some increase in his anxiety with having been back in the hospital again.  He does have a history of some cardiac issues as well as a pacemaker.  He does have a history of hypertension.  His blood pressures have not been bad since he has been here at the nursing home.  He does seem to be settling in relatively well.  He does not have any significant chest pain shortness of breath or other issues on examination today.  Review of Systems   Constitutional:  Positive for fatigue.   Musculoskeletal:  Positive for arthralgias, back pain and gait problem.   Neurological:  Positive for weakness.   All other systems reviewed and are negative.       OBJECTIVE:  There were no vitals taken for this visit.   Wt Readings from Last 3 Encounters:   No data found for Wt     BP Readings from Last 3 Encounters:   No data found for BP      Pulse Readings from Last 3 Encounters:   No data found for Pulse     There is no height or weight on file to calculate BMI.   Resp Readings from Last 3 Encounters:   No data found for Resp     Past medical, surgical, family and social history were reviewed and updated with the patient.     Physical Exam  Vitals and nursing note reviewed.   Constitutional:       Appearance: He is well-developed and overweight.   HENT:      Head: Normocephalic and atraumatic.      Right Ear: External ear normal.

## 2024-06-19 DIAGNOSIS — G89.29 OTHER CHRONIC PAIN: ICD-10-CM

## 2024-06-19 RX ORDER — OXYCODONE HYDROCHLORIDE 5 MG/1
5 TABLET ORAL EVERY 6 HOURS PRN
Qty: 120 TABLET | Refills: 0 | Status: SHIPPED | OUTPATIENT
Start: 2024-06-19 | End: 2024-07-19

## 2024-06-28 ENCOUNTER — OUTSIDE SERVICES (OUTPATIENT)
Dept: FAMILY MEDICINE CLINIC | Age: 89
End: 2024-06-28

## 2024-06-28 DIAGNOSIS — F41.9 ANXIETY: ICD-10-CM

## 2024-06-28 DIAGNOSIS — I10 HYPERTENSION, UNSPECIFIED TYPE: ICD-10-CM

## 2024-06-28 DIAGNOSIS — R53.1 WEAKNESS: ICD-10-CM

## 2024-06-28 DIAGNOSIS — G81.94 LEFT HEMIPARESIS (HCC): Primary | ICD-10-CM

## 2024-06-28 DIAGNOSIS — K92.2 GASTROINTESTINAL HEMORRHAGE, UNSPECIFIED GASTROINTESTINAL HEMORRHAGE TYPE: ICD-10-CM

## 2024-06-28 DIAGNOSIS — R29.898 RIGHT LEG WEAKNESS: ICD-10-CM

## 2024-06-28 ASSESSMENT — ENCOUNTER SYMPTOMS: BACK PAIN: 1

## 2024-06-28 NOTE — PROGRESS NOTES
SUBJECTIVE:    Patient ID: Robel Newell is a 88 y.o. male.    Chief Complaint   Patient presents with    Extremity Weakness     Recent gi bleeds, stroke       HPI: nursing home visit  Follow-up visit with him after his readmission.  He is still struggling with a lot of weakness.  He has not had any further bleeding.  His blood count continues to improve.  He is eating pretty good.  Though he will not eat the nursing home food his wife says.  He is spoiled she admits.  He is still complaining of quite a bit of weakness.  He is having some frustration with his functional ability.  He is really wanting to try to get better enough to go home.  He is doing his physical therapy.  He is trying what to do what he can.  His blood pressure seem to be doing okay.  He has not had any new issues otherwise    Review of Systems   Constitutional:  Positive for fatigue.   Musculoskeletal:  Positive for arthralgias, back pain and gait problem.   Neurological:  Positive for weakness.   All other systems reviewed and are negative.       OBJECTIVE:  There were no vitals taken for this visit.   Wt Readings from Last 3 Encounters:   No data found for Wt     BP Readings from Last 3 Encounters:   No data found for BP      Pulse Readings from Last 3 Encounters:   No data found for Pulse     There is no height or weight on file to calculate BMI.   Resp Readings from Last 3 Encounters:   No data found for Resp     Past medical, surgical, family and social history were reviewed and updated with the patient.     Physical Exam  Vitals and nursing note reviewed.   Constitutional:       Appearance: He is well-developed and overweight.   HENT:      Head: Normocephalic and atraumatic.      Right Ear: External ear normal. Decreased hearing noted.      Left Ear: External ear normal. Decreased hearing noted.      Nose: Nose normal.      Mouth/Throat:      Lips: Pink.      Mouth: Mucous membranes are moist.   Eyes:      General: Lids are normal.

## 2024-07-17 ENCOUNTER — HOSPITAL ENCOUNTER (EMERGENCY)
Facility: HOSPITAL | Age: 89
Discharge: NURSING, REHAB OR LONG-TERM CARE | End: 2024-07-18
Attending: EMERGENCY MEDICINE
Payer: MEDICARE

## 2024-07-17 DIAGNOSIS — D50.0 CHRONIC BLOOD LOSS ANEMIA: Primary | ICD-10-CM

## 2024-07-17 DIAGNOSIS — K55.21 AVM (ARTERIOVENOUS MALFORMATION) OF SMALL BOWEL, ACQUIRED WITH HEMORRHAGE: ICD-10-CM

## 2024-07-17 LAB
ABO + RH BLD: NORMAL
ALBUMIN SERPL-MCNC: 3.2 G/DL (ref 3.4–4.8)
ALBUMIN/GLOB SERPL: 1.3 {RATIO} (ref 0.8–2)
ALP SERPL-CCNC: 107 U/L (ref 25–100)
ALT SERPL-CCNC: 14 U/L (ref 4–36)
ANION GAP SERPL CALCULATED.3IONS-SCNC: 16 MMOL/L (ref 3–16)
AST SERPL-CCNC: 21 U/L (ref 8–33)
BASOPHILS # BLD: 0 K/UL (ref 0–0.1)
BASOPHILS NFR BLD: 0.5 %
BILIRUB SERPL-MCNC: 0.4 MG/DL (ref 0.3–1.2)
BLD GP AB SCN SERPL QL: NORMAL
BUN SERPL-MCNC: 14 MG/DL (ref 6–20)
CALCIUM SERPL-MCNC: 8.7 MG/DL (ref 8.5–10.5)
CHLORIDE SERPL-SCNC: 96 MMOL/L (ref 98–107)
CO2 SERPL-SCNC: 23 MMOL/L (ref 20–30)
CREAT SERPL-MCNC: 1 MG/DL (ref 0.4–1.2)
EOSINOPHIL # BLD: 0 K/UL (ref 0–0.4)
EOSINOPHIL NFR BLD: 0.5 %
ERYTHROCYTE [DISTWIDTH] IN BLOOD BY AUTOMATED COUNT: 18.6 % (ref 11–16)
GFR SERPLBLD CREATININE-BSD FMLA CKD-EPI: 72 ML/MIN/{1.73_M2}
GLOBULIN SER CALC-MCNC: 2.5 G/DL
GLUCOSE SERPL-MCNC: 98 MG/DL (ref 74–106)
HCT VFR BLD AUTO: 22.6 % (ref 40–54)
HCT VFR BLD AUTO: 23.9 % (ref 40–54)
HGB BLD-MCNC: 7.1 G/DL (ref 13–18)
HGB BLD-MCNC: 7.2 G/DL (ref 13–18)
IMM GRANULOCYTES # BLD: 0 K/UL
IMM GRANULOCYTES NFR BLD: 0.3 % (ref 0–5)
IRON SATN MFR SERPL: 7 % (ref 20–50)
IRON SERPL-MCNC: 17 UG/DL (ref 59–158)
LYMPHOCYTES # BLD: 0.6 K/UL (ref 1.5–4)
LYMPHOCYTES NFR BLD: 8.8 %
MCH RBC QN AUTO: 23.6 PG (ref 27–32)
MCHC RBC AUTO-ENTMCNC: 30.1 G/DL (ref 31–35)
MCV RBC AUTO: 78.4 FL (ref 80–100)
MONOCYTES # BLD: 0.6 K/UL (ref 0.2–0.8)
MONOCYTES NFR BLD: 9.6 %
NEUTROPHILS # BLD: 5.1 K/UL (ref 2–7.5)
NEUTS SEG NFR BLD: 80.3 %
PLATELET # BLD AUTO: 324 K/UL (ref 150–400)
PMV BLD AUTO: 9.6 FL (ref 6–10)
POTASSIUM SERPL-SCNC: 4 MMOL/L (ref 3.4–5.1)
PROT SERPL-MCNC: 5.7 G/DL (ref 6.4–8.3)
RBC # BLD AUTO: 3.05 M/UL (ref 4.5–6)
SODIUM SERPL-SCNC: 135 MMOL/L (ref 136–145)
TIBC SERPL-MCNC: 241 UG/DL (ref 250–450)
WBC # BLD AUTO: 6.3 K/UL (ref 4–11)

## 2024-07-17 PROCEDURE — 36430 TRANSFUSION BLD/BLD COMPNT: CPT

## 2024-07-17 PROCEDURE — 83540 ASSAY OF IRON: CPT

## 2024-07-17 PROCEDURE — 2580000003 HC RX 258

## 2024-07-17 PROCEDURE — 83550 IRON BINDING TEST: CPT

## 2024-07-17 PROCEDURE — 86901 BLOOD TYPING SEROLOGIC RH(D): CPT

## 2024-07-17 PROCEDURE — 86900 BLOOD TYPING SEROLOGIC ABO: CPT

## 2024-07-17 PROCEDURE — 36415 COLL VENOUS BLD VENIPUNCTURE: CPT

## 2024-07-17 PROCEDURE — 86920 COMPATIBILITY TEST SPIN: CPT

## 2024-07-17 PROCEDURE — P9016 RBC LEUKOCYTES REDUCED: HCPCS

## 2024-07-17 PROCEDURE — 85014 HEMATOCRIT: CPT

## 2024-07-17 PROCEDURE — 99285 EMERGENCY DEPT VISIT HI MDM: CPT

## 2024-07-17 PROCEDURE — 85025 COMPLETE CBC W/AUTO DIFF WBC: CPT

## 2024-07-17 PROCEDURE — 86850 RBC ANTIBODY SCREEN: CPT

## 2024-07-17 PROCEDURE — 85018 HEMOGLOBIN: CPT

## 2024-07-17 PROCEDURE — 96360 HYDRATION IV INFUSION INIT: CPT

## 2024-07-17 PROCEDURE — 80053 COMPREHEN METABOLIC PANEL: CPT

## 2024-07-17 RX ORDER — ONDANSETRON 4 MG/1
4 TABLET, ORALLY DISINTEGRATING ORAL EVERY 8 HOURS PRN
Status: DISCONTINUED | OUTPATIENT
Start: 2024-07-17 | End: 2024-07-17

## 2024-07-17 RX ORDER — SODIUM CHLORIDE 9 MG/ML
INJECTION, SOLUTION INTRAVENOUS
Status: COMPLETED
Start: 2024-07-17 | End: 2024-07-17

## 2024-07-17 RX ORDER — 0.9 % SODIUM CHLORIDE 0.9 %
1000 INTRAVENOUS SOLUTION INTRAVENOUS ONCE
Status: COMPLETED | OUTPATIENT
Start: 2024-07-17 | End: 2024-07-17

## 2024-07-17 RX ORDER — SODIUM CHLORIDE 9 MG/ML
INJECTION, SOLUTION INTRAVENOUS PRN
Status: DISCONTINUED | OUTPATIENT
Start: 2024-07-17 | End: 2024-07-18 | Stop reason: HOSPADM

## 2024-07-17 RX ADMIN — SODIUM CHLORIDE 1000 ML: 9 INJECTION, SOLUTION INTRAVENOUS at 18:44

## 2024-07-17 RX ADMIN — Medication 1000 ML: at 18:44

## 2024-07-17 ASSESSMENT — PAIN SCALES - GENERAL: PAINLEVEL_OUTOF10: 0

## 2024-07-17 ASSESSMENT — LIFESTYLE VARIABLES: HOW OFTEN DO YOU HAVE A DRINK CONTAINING ALCOHOL: NEVER

## 2024-07-17 ASSESSMENT — PAIN - FUNCTIONAL ASSESSMENT: PAIN_FUNCTIONAL_ASSESSMENT: 0-10

## 2024-07-17 NOTE — ED TRIAGE NOTES
Nh report from cindy    Sent by [facility] for a transfusion then transfer to [facility] to see why he is losing blood.   Pt has a critically low hemoglobin 7/15 6.7 was 9.5 on July 1.  This happens often for unknown reason.  They want to know why.  In may pt fell broke left arm had surgery and was sent [facility].     Pt states that he has been losing blood since last February 2023.

## 2024-07-17 NOTE — CONSENT
Informed Consent for Blood Component Transfusion Note    I have discussed with the patient the rationale for blood component transfusion; its benefits in treating or preventing fatigue, organ damage, or death; and its risk which includes mild transfusion reactions, rare risk of blood borne infection, or more serious but rare reactions. I have discussed the alternatives to transfusion, including the risk and consequences of not receiving transfusion. The patient had an opportunity to ask questions and had agreed to proceed with transfusion of blood components.    Electronically signed by Sachin Li MD on 7/17/24 at 4:33 PM EDT

## 2024-07-17 NOTE — ED PROVIDER NOTES
[facility]  EMERGENCY DEPARTMENT ENCOUNTER        Pt Name: Robel Newell  MRN: [id number]  Birthdate 1935  Date of evaluation: 7/17/2024  Provider: Sachin Li MD  PCP: Rubia Babcock MD  Note Started: 2:15 PM EDT 7/17/24    CHIEF COMPLAINT       Chief Complaint   Patient presents with    abnormal labs     Per NH low hemaglobin       HISTORY OF PRESENT ILLNESS: 1 or more Elements     History from : Patient and Caregiver    Limitations to history : None    Robel Newell is a 88 y.o. male who presents to complaint of low hemoglobin.  Patient's hemoglobin was apparently 7.6 today.  It was 9.5 on July 1.  Patient states that he has had GI bleeds for a year and a half now and has had a total of 9 pints of blood transfused over the past 18 months.  He has had colonoscopies and EGDs and they were not able to find the source of his bleeding.  He also had Enteral camera which was also inconclusive.  Patient currently denies any chest pain or shortness of breath.  He states that he has seen blood in his stool on and off over the past week.  He denies any syncope.  His blood was drawn 2 days ago as part of the regular observation of his hemoglobin levels.    Nursing Notes were all reviewed and agreed with or any disagreements were addressed in the HPI.    Current Outpatient Medications   Medication Instructions    oxyCODONE (ROXICODONE) 5 mg, Oral, EVERY 6 HOURS PRN       REVIEW OF SYSTEMS :      Review of Systems    Review of systems reviewed and negative except as in HPI/MDM    PAST MEDICAL HISTORY     Past Medical History:   Diagnosis Date    Anemia     Atherosclerotic heart disease     Chronic pulmonary edema     Chronic respiratory failure with hypoxia (HCC)     Chronic systolic heart failure (HCC)     CKD (chronic kidney disease)     Diabetes mellitus (HCC)     Essential hypertension     Hearing loss     Hyperlipidemia     Hypothyroid     Mild intermittent asthma

## 2024-07-17 NOTE — ED NOTES
Call to [facility] Roya mary states that she has paged gi and is waiting for a call back.  She states he may be in surgery

## 2024-07-18 ENCOUNTER — OUTSIDE SERVICES (OUTPATIENT)
Dept: FAMILY MEDICINE CLINIC | Age: 89
End: 2024-07-18

## 2024-07-18 VITALS
RESPIRATION RATE: 12 BRPM | DIASTOLIC BLOOD PRESSURE: 57 MMHG | HEIGHT: 70 IN | WEIGHT: 194 LBS | SYSTOLIC BLOOD PRESSURE: 104 MMHG | BODY MASS INDEX: 27.77 KG/M2 | OXYGEN SATURATION: 100 % | TEMPERATURE: 98.3 F | HEART RATE: 76 BPM

## 2024-07-18 DIAGNOSIS — F41.9 ANXIETY: ICD-10-CM

## 2024-07-18 DIAGNOSIS — K92.2 GASTROINTESTINAL HEMORRHAGE, UNSPECIFIED GASTROINTESTINAL HEMORRHAGE TYPE: ICD-10-CM

## 2024-07-18 DIAGNOSIS — R53.1 WEAKNESS: ICD-10-CM

## 2024-07-18 DIAGNOSIS — I10 HYPERTENSION, UNSPECIFIED TYPE: ICD-10-CM

## 2024-07-18 DIAGNOSIS — D64.9 ANEMIA, UNSPECIFIED TYPE: ICD-10-CM

## 2024-07-18 DIAGNOSIS — G81.94 LEFT HEMIPARESIS (HCC): Primary | ICD-10-CM

## 2024-07-18 ASSESSMENT — ENCOUNTER SYMPTOMS: BACK PAIN: 1

## 2024-07-18 NOTE — ED NOTES
Reviewed discharge plan with Robel Newell.  Encouraged him to f/u with Rubia Babcock MD and he understood.  NAD noted on discharge, gait steady.  Reviewed discharge prescription for:     Current Discharge Medication List          Robel states understanding of how and when to take medications.      Electronically signed by Jessica Conrad RN on 7/17/2024 at 9:48 PM

## 2024-07-18 NOTE — ED NOTES
Pts family at nurses station requesting update on eta of [facility] ems. [facility] ems called and advised as soon as a truck got back into the [facility]. This information relayed to pts family

## 2024-07-18 NOTE — ED NOTES
Pt advised wait for ambulance may be awhile. Advised monitoring equipment should be left on until ems arrives. Pt and family agreeable

## 2024-07-18 NOTE — PROGRESS NOTES
SUBJECTIVE:    Patient ID: Robel Newell is a 88 y.o. male.    Chief Complaint   Patient presents with    Extremity Weakness    Anemia       HPI: nursing home visit  Following up with him today about his weakness.  He is blood count was quite up issue when he was in the hospital.  He is concerned about that.  His blood count was dropping out very quickly.  They could not figure out where the blood was really going.  He is significantly concerned about that.  The plan for now is to recheck his hemoglobin weekly.  He is feeling like he is doing a little better.  He is eating what his wife brings him.  He is not really liking the food at the nursing home.  He has not had any chest pain.  He denies any shortness of breath.  He says he feels like he is some better than he was.  He obviously like to go home.  He realizes he needs to continue to stay due to the fact that his wife is unable to care for him at home by herself at this time    Review of Systems   Constitutional:  Positive for fatigue.   Musculoskeletal:  Positive for arthralgias, back pain and gait problem.   Neurological:  Positive for weakness.   All other systems reviewed and are negative.       OBJECTIVE:  There were no vitals taken for this visit.   Wt Readings from Last 3 Encounters:   07/17/24 88 kg (194 lb)     BP Readings from Last 3 Encounters:   07/18/24 (!) 104/57      Pulse Readings from Last 3 Encounters:   07/18/24 76     There is no height or weight on file to calculate BMI.   Resp Readings from Last 3 Encounters:   07/18/24 12     Past medical, surgical, family and social history were reviewed and updated with the patient.     Physical Exam  Vitals and nursing note reviewed.   Constitutional:       Appearance: He is well-developed and overweight.   HENT:      Head: Normocephalic and atraumatic.      Right Ear: External ear normal. Decreased hearing noted.      Left Ear: External ear normal. Decreased hearing noted.      Nose: Nose

## 2024-07-22 ENCOUNTER — TELEPHONE (OUTPATIENT)
Dept: FAMILY MEDICINE CLINIC | Age: 89
End: 2024-07-22

## 2024-07-22 NOTE — TELEPHONE ENCOUNTER
Sam Babcock,    You are probably aware already but I wanted to forward you the patient's iron panel results from his 7/17/24 ER visit.  Looks like his Iron is low at 17 ug/dL and Iron saturation is at 7%.  Wanted to notify you in case any iron infusions are warranted.    Thank you!  Tc Alvarado

## 2024-07-29 ENCOUNTER — HOSPITAL ENCOUNTER (EMERGENCY)
Facility: HOSPITAL | Age: 89
Discharge: HOME | End: 2024-07-29
Attending: FAMILY MEDICINE
Payer: MEDICARE

## 2024-07-29 VITALS
DIASTOLIC BLOOD PRESSURE: 64 MMHG | RESPIRATION RATE: 18 BRPM | TEMPERATURE: 98.6 F | OXYGEN SATURATION: 98 % | SYSTOLIC BLOOD PRESSURE: 108 MMHG | HEART RATE: 96 BPM

## 2024-07-29 DIAGNOSIS — D64.9 ANEMIA, UNSPECIFIED TYPE: Primary | ICD-10-CM

## 2024-07-29 LAB
ALBUMIN SERPL-MCNC: 3.3 G/DL (ref 3.4–4.8)
ALBUMIN/GLOB SERPL: 1.2 {RATIO} (ref 0.8–2)
ALP SERPL-CCNC: 122 U/L (ref 25–100)
ALT SERPL-CCNC: 12 U/L (ref 4–36)
ANION GAP SERPL CALCULATED.3IONS-SCNC: 15 MMOL/L (ref 3–16)
AST SERPL-CCNC: 21 U/L (ref 8–33)
BASOPHILS # BLD: 0 K/UL (ref 0–0.1)
BASOPHILS NFR BLD: 0.7 %
BILIRUB SERPL-MCNC: 0.4 MG/DL (ref 0.3–1.2)
BUN SERPL-MCNC: 14 MG/DL (ref 6–20)
CALCIUM SERPL-MCNC: 8.7 MG/DL (ref 8.5–10.5)
CHLORIDE SERPL-SCNC: 98 MMOL/L (ref 98–107)
CO2 SERPL-SCNC: 23 MMOL/L (ref 20–30)
CREAT SERPL-MCNC: 1 MG/DL (ref 0.4–1.2)
EOSINOPHIL # BLD: 0 K/UL (ref 0–0.4)
EOSINOPHIL NFR BLD: 0.7 %
ERYTHROCYTE [DISTWIDTH] IN BLOOD BY AUTOMATED COUNT: 19.1 % (ref 11–16)
GFR SERPLBLD CREATININE-BSD FMLA CKD-EPI: 72 ML/MIN/{1.73_M2}
GLOBULIN SER CALC-MCNC: 2.7 G/DL
GLUCOSE SERPL-MCNC: 97 MG/DL (ref 74–106)
HCT VFR BLD AUTO: 25.8 % (ref 40–54)
HGB BLD-MCNC: 7.9 G/DL (ref 13–18)
IMM GRANULOCYTES # BLD: 0 K/UL
IMM GRANULOCYTES NFR BLD: 0.3 % (ref 0–5)
LYMPHOCYTES # BLD: 0.7 K/UL (ref 1.5–4)
LYMPHOCYTES NFR BLD: 11.5 %
MCH RBC QN AUTO: 24.7 PG (ref 27–32)
MCHC RBC AUTO-ENTMCNC: 30.6 G/DL (ref 31–35)
MCV RBC AUTO: 80.6 FL (ref 80–100)
MONOCYTES # BLD: 0.6 K/UL (ref 0.2–0.8)
MONOCYTES NFR BLD: 10.1 %
NEUTROPHILS # BLD: 4.7 K/UL (ref 2–7.5)
NEUTS SEG NFR BLD: 76.7 %
PLATELET # BLD AUTO: 333 K/UL (ref 150–400)
PMV BLD AUTO: 9.3 FL (ref 6–10)
POTASSIUM SERPL-SCNC: 3.9 MMOL/L (ref 3.4–5.1)
PROT SERPL-MCNC: 6 G/DL (ref 6.4–8.3)
RBC # BLD AUTO: 3.2 M/UL (ref 4.5–6)
SODIUM SERPL-SCNC: 136 MMOL/L (ref 136–145)
WBC # BLD AUTO: 6.1 K/UL (ref 4–11)

## 2024-07-29 PROCEDURE — 36415 COLL VENOUS BLD VENIPUNCTURE: CPT

## 2024-07-29 PROCEDURE — 85025 COMPLETE CBC W/AUTO DIFF WBC: CPT

## 2024-07-29 PROCEDURE — 99283 EMERGENCY DEPT VISIT LOW MDM: CPT

## 2024-07-29 PROCEDURE — 80053 COMPREHEN METABOLIC PANEL: CPT

## 2024-07-29 RX ORDER — KETOROLAC TROMETHAMINE 30 MG/ML
30 INJECTION, SOLUTION INTRAMUSCULAR; INTRAVENOUS ONCE
Status: DISCONTINUED | OUTPATIENT
Start: 2024-07-29 | End: 2024-07-29

## 2024-07-29 RX ORDER — DEXAMETHASONE SODIUM PHOSPHATE 10 MG/ML
10 INJECTION INTRAMUSCULAR; INTRAVENOUS ONCE
Status: DISCONTINUED | OUTPATIENT
Start: 2024-07-29 | End: 2024-07-29

## 2024-07-29 ASSESSMENT — PAIN - FUNCTIONAL ASSESSMENT: PAIN_FUNCTIONAL_ASSESSMENT: NONE - DENIES PAIN

## 2024-07-29 ASSESSMENT — LIFESTYLE VARIABLES
HOW OFTEN DO YOU HAVE A DRINK CONTAINING ALCOHOL: NEVER
HOW MANY STANDARD DRINKS CONTAINING ALCOHOL DO YOU HAVE ON A TYPICAL DAY: PATIENT DOES NOT DRINK

## 2024-07-29 NOTE — ED NOTES
Received reports from, Nurse at [facility].    Per reports, PT has chronic GI Bleed that requires frequent blood transfusion. Last one being 7/22, reports hgb was routinely checked this morning, resulting as 6.6. Nurse reports that pt is asymptomatic, no bloody stools or emesis, is being sent to ER for blood transfusion.     Upon chart review, pt has slow bleeding AVM.

## 2024-07-29 NOTE — ED PROVIDER NOTES
[facility]  EMERGENCY DEPARTMENT ENCOUNTER        Pt Name: Robel Newell  MRN: [id number]  Birthdate 1935  Date of evaluation: 7/29/2024  Provider: Alexis Barlow MD  PCP: Rubia Babcock MD  Note Started: 3:43 PM EDT 7/29/24    CHIEF COMPLAINT       Chief Complaint   Patient presents with    Anemia       HISTORY OF PRESENT ILLNESS: 1 or more Elements     History from : Patient, Family  , and EMS    Limitations to history : None    Robel Newell is a 88 y.o. male who presents to the emergency department via EMS from the nursing home for hemoglobin of 6.6.  No active bleeding.  Patient has had multiple blood transfusions in the past last blood transfusion 722.  Patient has his hemoglobin checked routinely.  Patient denies any symptoms at this time.  No chest pain or shortness of breath no palpitations no dizziness.    Nursing Notes were all reviewed and agreed with or any disagreements were addressed in the HPI.    REVIEW OF SYSTEMS :      Review of Systems    All systems reviewed and negative except as in HPI/MDM    SURGICAL HISTORY   History reviewed. No pertinent surgical history.    CURRENTMEDICATIONS       Previous Medications    No medications on file       ALLERGIES     Pcn [penicillins] and Sulfa antibiotics    FAMILYHISTORY     History reviewed. No pertinent family history.     SOCIAL HISTORY       Social History     Tobacco Use    Smoking status: Never    Smokeless tobacco: Never   Substance Use Topics    Alcohol use: Not Currently    Drug use: Not Currently       SCREENINGS        [place] Coma Scale  Eye Opening: Spontaneous  Best Verbal Response: Oriented  Best Motor Response: Obeys commands  [place] Coma Scale Score: 15                            PHYSICAL EXAM  1 or more Elements     ED Triage Vitals   BP Temp Temp src Pulse Resp SpO2 Height Weight   -- -- -- -- -- -- -- --       Physical Exam    My pulse oximetry interpretation is which is within the

## 2024-08-01 ENCOUNTER — TELEPHONE (OUTPATIENT)
Dept: FAMILY MEDICINE CLINIC | Age: 89
End: 2024-08-01

## 2024-08-01 ENCOUNTER — HOSPITAL ENCOUNTER (OUTPATIENT)
Dept: NURSING | Facility: HOSPITAL | Age: 89
Setting detail: INFUSION SERIES
Discharge: HOME | End: 2024-08-03
Payer: MEDICARE

## 2024-08-01 VITALS
HEART RATE: 74 BPM | OXYGEN SATURATION: 100 % | RESPIRATION RATE: 18 BRPM | DIASTOLIC BLOOD PRESSURE: 53 MMHG | TEMPERATURE: 98.1 F | SYSTOLIC BLOOD PRESSURE: 98 MMHG

## 2024-08-01 DIAGNOSIS — D64.9 ANEMIA, UNSPECIFIED TYPE: Primary | ICD-10-CM

## 2024-08-01 LAB
ABO + RH BLD: NORMAL
BASOPHILS # BLD: 0 K/UL (ref 0–0.1)
BASOPHILS NFR BLD: 0.5 %
BLD GP AB SCN SERPL QL: NORMAL
EOSINOPHIL # BLD: 0.1 K/UL (ref 0–0.4)
EOSINOPHIL NFR BLD: 0.9 %
ERYTHROCYTE [DISTWIDTH] IN BLOOD BY AUTOMATED COUNT: 18.8 % (ref 11–16)
HCT VFR BLD AUTO: 24 % (ref 40–54)
HCT VFR BLD AUTO: 24.2 % (ref 40–54)
HGB BLD-MCNC: 7.4 G/DL (ref 13–18)
HGB BLD-MCNC: 7.6 G/DL (ref 13–18)
IMM GRANULOCYTES # BLD: 0 K/UL
IMM GRANULOCYTES NFR BLD: 0.7 % (ref 0–5)
LYMPHOCYTES # BLD: 0.5 K/UL (ref 1.5–4)
LYMPHOCYTES NFR BLD: 8.5 %
MCH RBC QN AUTO: 24.6 PG (ref 27–32)
MCHC RBC AUTO-ENTMCNC: 30.8 G/DL (ref 31–35)
MCV RBC AUTO: 79.7 FL (ref 80–100)
MONOCYTES # BLD: 0.6 K/UL (ref 0.2–0.8)
MONOCYTES NFR BLD: 10.9 %
NEUTROPHILS # BLD: 4.5 K/UL (ref 2–7.5)
NEUTS SEG NFR BLD: 78.5 %
PLATELET # BLD AUTO: 301 K/UL (ref 150–400)
PMV BLD AUTO: 8.8 FL (ref 6–10)
RBC # BLD AUTO: 3.01 M/UL (ref 4.5–6)
WBC # BLD AUTO: 5.8 K/UL (ref 4–11)

## 2024-08-01 PROCEDURE — 85014 HEMATOCRIT: CPT

## 2024-08-01 PROCEDURE — 86900 BLOOD TYPING SEROLOGIC ABO: CPT

## 2024-08-01 PROCEDURE — P9016 RBC LEUKOCYTES REDUCED: HCPCS

## 2024-08-01 PROCEDURE — 86920 COMPATIBILITY TEST SPIN: CPT

## 2024-08-01 PROCEDURE — 85025 COMPLETE CBC W/AUTO DIFF WBC: CPT

## 2024-08-01 PROCEDURE — 36430 TRANSFUSION BLD/BLD COMPNT: CPT | Performed by: NURSE PRACTITIONER

## 2024-08-01 PROCEDURE — 85018 HEMOGLOBIN: CPT

## 2024-08-01 PROCEDURE — 86901 BLOOD TYPING SEROLOGIC RH(D): CPT

## 2024-08-01 PROCEDURE — 6370000000 HC RX 637 (ALT 250 FOR IP): Performed by: FAMILY MEDICINE

## 2024-08-01 PROCEDURE — 36415 COLL VENOUS BLD VENIPUNCTURE: CPT

## 2024-08-01 PROCEDURE — 86850 RBC ANTIBODY SCREEN: CPT

## 2024-08-01 RX ORDER — SODIUM CHLORIDE 9 MG/ML
25 INJECTION, SOLUTION INTRAVENOUS PRN
Status: CANCELLED | OUTPATIENT
Start: 2024-08-01

## 2024-08-01 RX ORDER — ONDANSETRON 2 MG/ML
8 INJECTION INTRAMUSCULAR; INTRAVENOUS
OUTPATIENT
Start: 2024-08-01

## 2024-08-01 RX ORDER — SODIUM CHLORIDE 9 MG/ML
20 INJECTION, SOLUTION INTRAVENOUS CONTINUOUS
Status: CANCELLED | OUTPATIENT
Start: 2024-08-01

## 2024-08-01 RX ORDER — EPINEPHRINE 1 MG/ML
0.3 INJECTION, SOLUTION INTRAMUSCULAR; SUBCUTANEOUS PRN
OUTPATIENT
Start: 2024-08-01

## 2024-08-01 RX ORDER — SODIUM CHLORIDE 0.9 % (FLUSH) 0.9 %
5-40 SYRINGE (ML) INJECTION PRN
Status: CANCELLED | OUTPATIENT
Start: 2024-08-01

## 2024-08-01 RX ORDER — ALBUTEROL SULFATE 90 UG/1
4 AEROSOL, METERED RESPIRATORY (INHALATION) PRN
OUTPATIENT
Start: 2024-08-01

## 2024-08-01 RX ORDER — ACETAMINOPHEN 325 MG/1
650 TABLET ORAL ONCE
Status: CANCELLED | OUTPATIENT
Start: 2024-08-01 | End: 2024-08-01

## 2024-08-01 RX ORDER — ACETAMINOPHEN 325 MG/1
650 TABLET ORAL
OUTPATIENT
Start: 2024-08-01

## 2024-08-01 RX ORDER — SODIUM CHLORIDE 9 MG/ML
INJECTION, SOLUTION INTRAVENOUS CONTINUOUS
OUTPATIENT
Start: 2024-08-01

## 2024-08-01 RX ORDER — DIPHENHYDRAMINE HYDROCHLORIDE 50 MG/ML
50 INJECTION INTRAMUSCULAR; INTRAVENOUS
OUTPATIENT
Start: 2024-08-01

## 2024-08-01 RX ORDER — SODIUM CHLORIDE 0.9 % (FLUSH) 0.9 %
5-40 SYRINGE (ML) INJECTION PRN
Status: ACTIVE | OUTPATIENT
Start: 2024-08-01 | End: 2024-08-02

## 2024-08-01 RX ORDER — DIPHENHYDRAMINE HCL 25 MG
25 TABLET ORAL ONCE
Status: COMPLETED | OUTPATIENT
Start: 2024-08-01 | End: 2024-08-01

## 2024-08-01 RX ORDER — DIPHENHYDRAMINE HCL 25 MG
25 TABLET ORAL ONCE
Status: CANCELLED | OUTPATIENT
Start: 2024-08-01 | End: 2024-08-01

## 2024-08-01 RX ORDER — SODIUM CHLORIDE 9 MG/ML
25 INJECTION, SOLUTION INTRAVENOUS PRN
Status: ACTIVE | OUTPATIENT
Start: 2024-08-01 | End: 2024-08-02

## 2024-08-01 RX ORDER — SODIUM CHLORIDE 9 MG/ML
20 INJECTION, SOLUTION INTRAVENOUS CONTINUOUS
Status: DISCONTINUED | OUTPATIENT
Start: 2024-08-01 | End: 2024-08-04 | Stop reason: HOSPADM

## 2024-08-01 RX ORDER — ACETAMINOPHEN 325 MG/1
650 TABLET ORAL ONCE
Status: COMPLETED | OUTPATIENT
Start: 2024-08-01 | End: 2024-08-01

## 2024-08-01 RX ADMIN — ACETAMINOPHEN 650 MG: 325 TABLET, FILM COATED ORAL at 15:51

## 2024-08-01 RX ADMIN — DIPHENHYDRAMINE HYDROCHLORIDE 25 MG: 25 TABLET ORAL at 15:51

## 2024-08-01 NOTE — TELEPHONE ENCOUNTER
Robel called and was concerned because they were sending him back to the nursing home without the transfusion. I talked to Dr. Babcock and she stated that she was concerned because the patients hemoglobin was 8.0 on Monday and 7.4 today. She stated that she was afraid that it would drop much lower over the weekend when the clinic is closed. I called and spoke with Tc and he was going to let the lab and nurse know Dr. Godlman concerns.

## 2024-08-01 NOTE — TELEPHONE ENCOUNTER
Tc Alvarado called me back and stated that the hospital was ok with giving Mr. Newell 1 unit of blood. They are doing that now.

## 2024-08-10 DIAGNOSIS — G89.29 OTHER CHRONIC PAIN: ICD-10-CM

## 2024-08-10 RX ORDER — OXYCODONE HYDROCHLORIDE 5 MG/1
5 TABLET ORAL EVERY 6 HOURS PRN
Qty: 120 TABLET | Refills: 0 | Status: SHIPPED | OUTPATIENT
Start: 2024-08-10 | End: 2024-09-09

## 2024-08-12 ENCOUNTER — HOSPITAL ENCOUNTER (EMERGENCY)
Facility: HOSPITAL | Age: 89
Discharge: HOME | End: 2024-08-12
Attending: HOSPITALIST
Payer: MEDICARE

## 2024-08-12 ENCOUNTER — APPOINTMENT (OUTPATIENT)
Dept: CT IMAGING | Facility: HOSPITAL | Age: 89
End: 2024-08-12
Attending: HOSPITALIST
Payer: MEDICARE

## 2024-08-12 ENCOUNTER — APPOINTMENT (OUTPATIENT)
Dept: CT IMAGING | Facility: HOSPITAL | Age: 89
End: 2024-08-12
Payer: MEDICARE

## 2024-08-12 VITALS
HEART RATE: 88 BPM | SYSTOLIC BLOOD PRESSURE: 119 MMHG | BODY MASS INDEX: 27.55 KG/M2 | DIASTOLIC BLOOD PRESSURE: 67 MMHG | TEMPERATURE: 98.6 F | OXYGEN SATURATION: 100 % | RESPIRATION RATE: 18 BRPM | WEIGHT: 192 LBS

## 2024-08-12 DIAGNOSIS — G45.9 TIA (TRANSIENT ISCHEMIC ATTACK): Primary | ICD-10-CM

## 2024-08-12 DIAGNOSIS — I65.23 STENOSIS OF BOTH INTERNAL CAROTID ARTERIES: ICD-10-CM

## 2024-08-12 LAB
ALBUMIN SERPL-MCNC: 3.1 G/DL (ref 3.4–4.8)
ALBUMIN/GLOB SERPL: 1.1 {RATIO} (ref 0.8–2)
ALP SERPL-CCNC: 347 U/L (ref 25–100)
ALT SERPL-CCNC: 42 U/L (ref 4–36)
ANION GAP SERPL CALCULATED.3IONS-SCNC: 15 MMOL/L (ref 3–16)
AST SERPL-CCNC: 131 U/L (ref 8–33)
BASOPHILS # BLD: 0 K/UL (ref 0–0.1)
BASOPHILS NFR BLD: 0.3 %
BILIRUB SERPL-MCNC: 0.8 MG/DL (ref 0.3–1.2)
BILIRUB UR QL STRIP.AUTO: NEGATIVE
BUN SERPL-MCNC: 18 MG/DL (ref 6–20)
CALCIUM SERPL-MCNC: 8.8 MG/DL (ref 8.5–10.5)
CHLORIDE SERPL-SCNC: 94 MMOL/L (ref 98–107)
CLARITY UR: CLEAR
CO2 SERPL-SCNC: 24 MMOL/L (ref 20–30)
COLOR UR: YELLOW
CREAT SERPL-MCNC: 1.1 MG/DL (ref 0.4–1.2)
EOSINOPHIL # BLD: 0 K/UL (ref 0–0.4)
EOSINOPHIL NFR BLD: 0.3 %
ERYTHROCYTE [DISTWIDTH] IN BLOOD BY AUTOMATED COUNT: 18.7 % (ref 11–16)
GFR SERPLBLD CREATININE-BSD FMLA CKD-EPI: 64 ML/MIN/{1.73_M2}
GLOBULIN SER CALC-MCNC: 2.7 G/DL
GLUCOSE BLD-MCNC: 192 MG/DL (ref 74–106)
GLUCOSE SERPL-MCNC: 189 MG/DL (ref 74–106)
GLUCOSE UR STRIP.AUTO-MCNC: 500 MG/DL
HCT VFR BLD AUTO: 27.3 % (ref 40–54)
HGB BLD-MCNC: 8.5 G/DL (ref 13–18)
HGB UR QL STRIP.AUTO: NEGATIVE
IMM GRANULOCYTES # BLD: 0 K/UL
IMM GRANULOCYTES NFR BLD: 0.3 % (ref 0–5)
KETONES UR STRIP.AUTO-MCNC: NEGATIVE MG/DL
LEUKOCYTE ESTERASE UR QL STRIP.AUTO: NEGATIVE
LYMPHOCYTES # BLD: 0.4 K/UL (ref 1.5–4)
LYMPHOCYTES NFR BLD: 5.4 %
MCH RBC QN AUTO: 25 PG (ref 27–32)
MCHC RBC AUTO-ENTMCNC: 31.1 G/DL (ref 31–35)
MCV RBC AUTO: 80.3 FL (ref 80–100)
MONOCYTES # BLD: 0.6 K/UL (ref 0.2–0.8)
MONOCYTES NFR BLD: 9.2 %
NEUTROPHILS # BLD: 5.8 K/UL (ref 2–7.5)
NEUTS SEG NFR BLD: 84.5 %
NITRITE UR QL STRIP.AUTO: NEGATIVE
PERFORMED ON: ABNORMAL
PH UR STRIP.AUTO: 6 [PH] (ref 5–8)
PLATELET # BLD AUTO: 247 K/UL (ref 150–400)
PMV BLD AUTO: 9.2 FL (ref 6–10)
POTASSIUM SERPL-SCNC: 4.3 MMOL/L (ref 3.4–5.1)
PROT SERPL-MCNC: 5.8 G/DL (ref 6.4–8.3)
PROT UR STRIP.AUTO-MCNC: NEGATIVE MG/DL
RBC # BLD AUTO: 3.4 M/UL (ref 4.5–6)
SARS-COV-2 RDRP RESP QL NAA+PROBE: NOT DETECTED
SODIUM SERPL-SCNC: 133 MMOL/L (ref 136–145)
SP GR UR STRIP.AUTO: 1.01 (ref 1–1.03)
UA COMPLETE W REFLEX CULTURE PNL UR: ABNORMAL
UA DIPSTICK W REFLEX MICRO PNL UR: ABNORMAL
URN SPEC COLLECT METH UR: ABNORMAL
UROBILINOGEN UR STRIP-ACNC: 0.2 E.U./DL
WBC # BLD AUTO: 6.9 K/UL (ref 4–11)

## 2024-08-12 PROCEDURE — 87635 SARS-COV-2 COVID-19 AMP PRB: CPT

## 2024-08-12 PROCEDURE — 6360000004 HC RX CONTRAST MEDICATION: Performed by: HOSPITALIST

## 2024-08-12 PROCEDURE — 70498 CT ANGIOGRAPHY NECK: CPT

## 2024-08-12 PROCEDURE — 80053 COMPREHEN METABOLIC PANEL: CPT

## 2024-08-12 PROCEDURE — 36415 COLL VENOUS BLD VENIPUNCTURE: CPT

## 2024-08-12 PROCEDURE — 70450 CT HEAD/BRAIN W/O DYE: CPT

## 2024-08-12 PROCEDURE — 85025 COMPLETE CBC W/AUTO DIFF WBC: CPT

## 2024-08-12 PROCEDURE — 81003 URINALYSIS AUTO W/O SCOPE: CPT

## 2024-08-12 PROCEDURE — 99285 EMERGENCY DEPT VISIT HI MDM: CPT

## 2024-08-12 PROCEDURE — 70496 CT ANGIOGRAPHY HEAD: CPT

## 2024-08-12 RX ORDER — LISINOPRIL 2.5 MG/1
2.5 TABLET ORAL DAILY
COMMUNITY

## 2024-08-12 RX ORDER — ASPIRIN 81 MG/1
81 TABLET ORAL DAILY
COMMUNITY

## 2024-08-12 RX ORDER — ARFORMOTEROL TARTRATE 15 UG/2ML
1 SOLUTION RESPIRATORY (INHALATION) 2 TIMES DAILY
COMMUNITY

## 2024-08-12 RX ORDER — LUBIPROSTONE 24 UG/1
24 CAPSULE ORAL 2 TIMES DAILY WITH MEALS
COMMUNITY

## 2024-08-12 RX ORDER — LACTULOSE 10 G/15ML
20 SOLUTION ORAL 2 TIMES DAILY
COMMUNITY

## 2024-08-12 RX ORDER — DOCUSATE SODIUM 100 MG/1
100 CAPSULE, LIQUID FILLED ORAL 2 TIMES DAILY
COMMUNITY

## 2024-08-12 RX ORDER — BUDESONIDE 0.5 MG/2ML
1 INHALANT ORAL 2 TIMES DAILY
COMMUNITY

## 2024-08-12 RX ORDER — POLYETHYLENE GLYCOL 3350 17 G/17G
17 POWDER, FOR SOLUTION ORAL DAILY
COMMUNITY

## 2024-08-12 RX ORDER — FERROUS SULFATE 325(65) MG
325 TABLET ORAL
COMMUNITY

## 2024-08-12 RX ORDER — LEVOCETIRIZINE DIHYDROCHLORIDE 5 MG/1
5 TABLET, FILM COATED ORAL NIGHTLY
COMMUNITY

## 2024-08-12 RX ORDER — SIMVASTATIN 40 MG
40 TABLET ORAL NIGHTLY
COMMUNITY

## 2024-08-12 RX ORDER — 0.9 % SODIUM CHLORIDE 0.9 %
500 INTRAVENOUS SOLUTION INTRAVENOUS ONCE
Status: DISCONTINUED | OUTPATIENT
Start: 2024-08-12 | End: 2024-08-12 | Stop reason: HOSPADM

## 2024-08-12 RX ORDER — POTASSIUM CHLORIDE 750 MG/1
10 CAPSULE, EXTENDED RELEASE ORAL DAILY
COMMUNITY

## 2024-08-12 RX ORDER — ACETAMINOPHEN 500 MG
500 TABLET ORAL EVERY 6 HOURS PRN
COMMUNITY

## 2024-08-12 RX ORDER — PANTOPRAZOLE SODIUM 40 MG/1
40 TABLET, DELAYED RELEASE ORAL DAILY
COMMUNITY

## 2024-08-12 RX ORDER — AMIODARONE HYDROCHLORIDE 200 MG/1
200 TABLET ORAL DAILY
COMMUNITY

## 2024-08-12 RX ORDER — SUCRALFATE 1 G/1
1 TABLET ORAL 4 TIMES DAILY
COMMUNITY

## 2024-08-12 RX ORDER — MISOPROSTOL 200 UG/1
200 TABLET ORAL ONCE
COMMUNITY

## 2024-08-12 RX ORDER — ASCORBIC ACID 500 MG
500 TABLET ORAL DAILY
COMMUNITY

## 2024-08-12 RX ORDER — LEVOTHYROXINE SODIUM 0.2 MG/1
200 TABLET ORAL DAILY
COMMUNITY

## 2024-08-12 RX ORDER — GLIPIZIDE 5 MG/1
5 TABLET ORAL DAILY
COMMUNITY

## 2024-08-12 RX ORDER — FUROSEMIDE 40 MG/1
40 TABLET ORAL DAILY
COMMUNITY

## 2024-08-12 RX ADMIN — IOPAMIDOL 100 ML: 755 INJECTION, SOLUTION INTRAVENOUS at 16:12

## 2024-08-12 NOTE — ED NOTES
Pt left ED on stretcher with [facility] at this time for return to [facility]. Pt and family verbalized understanding of discharge instructions. Pt A&Ox4, GCS 15 at time of discharge.

## 2024-08-12 NOTE — ED NOTES
[facility]. Family at bedside.

## 2024-08-12 NOTE — ED TRIAGE NOTES
Pt arrives to ED via [facility] from [facility]. Received report from ISABEL Herron. She states that she was called to assess patient at approx 1405. She states pt had facial droop to L mouth and was altered and slurring some words. Pt has chronic weakness to his L arm after having fracture surgery. FSBS 242 at NH. Upon arrival to ED pt with mild facial droop to L side of mouth, pt A&Ox3, GCS 15. Pt denies headache or dizziness.

## 2024-08-12 NOTE — ED NOTES
Pt refuses EKG, states the adhesive on the stickers cause his skin to became raw and sore. Dr. Keating aware.

## 2024-08-12 NOTE — FLOWSHEET NOTE
08/12/24 1440   NIHSS Stroke Scale   NIHSS Stroke Scale Assessed Yes   Interval Baseline   Level of Consciousness (1a) 0   LOC Questions (1b) 0   LOC Commands (1c) 0   Best Gaze (2) 0   Visual (3) 0   Facial Palsy (4) (!) 1   Motor Arm, Left (5a) 0   Motor Arm, Right (5b) 0   Motor Leg, Left (6a) 0   Motor Leg, Right (6b) 0   Limb Ataxia (7) 0   Sensory (8) 0   Best Language (9) 0   Dysarthria (10) 1   Extinction and Inattention (11) 0   Total 2     Pt with mild facial droop to L side of the mouth, pt slurring some words. EMS thinks pt is slurred based on past transports.

## 2024-08-12 NOTE — ED NOTES
[facility]

## 2024-08-12 NOTE — ED PROVIDER NOTES
Mild right mastoid air cell fluid      ORBITS: Normal.      EXTRACRANIAL SOFT TISSUES: Normal.      OTHER: None.      IMPRESSION:      1.  CTA head: No large vessel arterial occlusion.   2.  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    3.  Moderate cerebral atrophy. 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      Electronically signed by Taylor Ackerman DO      CT Head WO Contrast   Final Result      No acute intracranial abnormality.      Electronically signed by Henrique Sams        No results found.    No results found.    PROCEDURES   Unless otherwise noted below, none     Procedures    CRITICAL CARE TIME   None    EMERGENCY DEPARTMENT COURSE and DIFFERENTIAL DIAGNOSIS/MDM:   Vitals:    Vitals:    08/12/24 1515 08/12/24 1530 08/12/24 1545 08/12/24 1630   BP:    119/67   Pulse:    89   Resp:    18   Temp:       TempSrc:       SpO2: 99% 100% 100% 100%   Weight:           Patient was given the following medications:  Medications   sodium chloride 0.9 % bolus 500 mL (has no administration in time range)   iopamidol (ISOVUE-370) 76 % injection 100 mL (100 mLs IntraVENous Given 8/12/24 1612)            Is this patient to be included in the SEP-1 Core Measure due to severe sepsis or septic shock?   No   Exclusion criteria - the patient is NOT to be included for SEP-1 Core Measure due to:  Infection is not suspected    PAST MEDICAL HISTORY      has a past medical history of Anemia, Atherosclerotic heart disease, Chronic pulmonary edema, Chronic respiratory failure with hypoxia (HCC), Chronic systolic heart failure (HCC), CKD (chronic kidney disease), Diabetes mellitus (HCC), Essential hypertension, Hearing loss, Hyperlipidemia, Hypothyroid, Mild intermittent asthma, Muscle weakness (generalized), NSTEMI (non-ST elevated myocardial infarction) ([facility]), Other fracture of shaft of left humerus, subsequent encounter for fracture with routine healing, Other

## 2024-08-12 NOTE — FLOWSHEET NOTE
08/12/24 7046   NIHSS Stroke Scale   NIHSS Stroke Scale Assessed Yes   Interval Reassessment   Level of Consciousness (1a) 0   LOC Questions (1b) 0   LOC Commands (1c) 0   Best Gaze (2) 0   Visual (3) 0   Facial Palsy (4) 0   Motor Arm, Left (5a) 0   Motor Arm, Right (5b) 0   Motor Leg, Left (6a) 0   Motor Leg, Right (6b) 0   Limb Ataxia (7) 0   Sensory (8) 0   Best Language (9) 0   Dysarthria (10) 0   Extinction and Inattention (11) 0   Total 0

## 2024-08-12 NOTE — ED NOTES
Report to [facility] at this time.

## 2024-09-13 ENCOUNTER — OUTSIDE SERVICES (OUTPATIENT)
Dept: FAMILY MEDICINE CLINIC | Age: 89
End: 2024-09-13

## 2024-09-13 DIAGNOSIS — D64.9 ANEMIA, UNSPECIFIED TYPE: ICD-10-CM

## 2024-09-13 DIAGNOSIS — R29.898 RIGHT LEG WEAKNESS: ICD-10-CM

## 2024-09-13 DIAGNOSIS — R53.1 WEAKNESS: ICD-10-CM

## 2024-09-13 DIAGNOSIS — G81.94 LEFT HEMIPARESIS (HCC): Primary | ICD-10-CM

## 2024-09-13 DIAGNOSIS — L98.9 SKIN LESION OF HAND: ICD-10-CM

## 2024-09-20 ASSESSMENT — ENCOUNTER SYMPTOMS: BACK PAIN: 1

## 2024-10-13 DIAGNOSIS — G89.29 OTHER CHRONIC PAIN: ICD-10-CM

## 2024-10-13 RX ORDER — OXYCODONE HYDROCHLORIDE 5 MG/1
5 TABLET ORAL EVERY 6 HOURS PRN
Qty: 120 TABLET | Refills: 0 | Status: SHIPPED | OUTPATIENT
Start: 2024-10-13 | End: 2024-11-12

## 2024-11-15 ENCOUNTER — OUTSIDE SERVICES (OUTPATIENT)
Dept: FAMILY MEDICINE CLINIC | Age: 89
End: 2024-11-15

## 2024-11-15 DIAGNOSIS — G81.94 LEFT HEMIPARESIS (HCC): Primary | ICD-10-CM

## 2024-11-15 DIAGNOSIS — D64.9 ANEMIA, UNSPECIFIED TYPE: ICD-10-CM

## 2024-11-15 DIAGNOSIS — R53.1 WEAKNESS: ICD-10-CM

## 2024-11-15 DIAGNOSIS — F41.9 ANXIETY: ICD-10-CM

## 2024-11-15 DIAGNOSIS — K92.2 GASTROINTESTINAL HEMORRHAGE, UNSPECIFIED GASTROINTESTINAL HEMORRHAGE TYPE: ICD-10-CM

## 2024-11-15 DIAGNOSIS — L98.9 SKIN LESION OF HAND: ICD-10-CM

## 2024-11-15 DIAGNOSIS — I10 HYPERTENSION, UNSPECIFIED TYPE: ICD-10-CM

## 2024-11-21 DIAGNOSIS — G89.29 OTHER CHRONIC PAIN: ICD-10-CM

## 2024-11-21 RX ORDER — OXYCODONE HYDROCHLORIDE 5 MG/1
5 TABLET ORAL EVERY 6 HOURS PRN
Qty: 120 TABLET | Refills: 0 | Status: SHIPPED | OUTPATIENT
Start: 2024-11-21 | End: 2024-12-21

## 2024-11-22 ASSESSMENT — ENCOUNTER SYMPTOMS
COLOR CHANGE: 1
BACK PAIN: 1

## 2024-11-22 NOTE — PROGRESS NOTES
SUBJECTIVE:    Patient ID: Robel Newell is a 89 y.o. male.    Chief Complaint   Patient presents with    Skin Lesion    Extremity Weakness    Anemia       HPI: nursing home visit  .  He is having some issues with his skin.  He has been to see dermatology this last week they have done some freezing several places on his scalp face and arms.  He did have an area on his scalp that they did culture.  We have not gotten the results of that back yet.  He is can complaining of continued weakness though his blood cell count seems to be holding steady.  He is keeping his hemoglobin above 7.  He does seem to be tolerating his medicines well.  He is having a place on his left hand that is draining and looking a little more red and swollen.    Review of Systems   Constitutional:  Positive for fatigue.   Musculoskeletal:  Positive for arthralgias, back pain and gait problem.   Skin:  Positive for color change and wound.   Neurological:  Positive for weakness.   All other systems reviewed and are negative.       OBJECTIVE:  There were no vitals taken for this visit.   Wt Readings from Last 3 Encounters:   08/12/24 87.1 kg (192 lb)   07/17/24 88 kg (194 lb)     BP Readings from Last 3 Encounters:   08/12/24 119/67   08/01/24 (!) 98/53   07/29/24 108/64      Pulse Readings from Last 3 Encounters:   08/12/24 88   08/01/24 74   07/29/24 96     There is no height or weight on file to calculate BMI.   Resp Readings from Last 3 Encounters:   08/12/24 18   08/01/24 18   07/29/24 18     Past medical, surgical, family and social history were reviewed and updated with the patient.     Physical Exam  Vitals and nursing note reviewed.   Constitutional:       Appearance: He is well-developed and overweight.   HENT:      Head: Normocephalic and atraumatic.      Right Ear: External ear normal. Decreased hearing noted.      Left Ear: External ear normal. Decreased hearing noted.      Nose: Nose normal.      Mouth/Throat:      Lips: Pink.

## 2024-12-13 PROCEDURE — 99308 SBSQ NF CARE LOW MDM 20: CPT | Performed by: FAMILY MEDICINE

## 2024-12-30 ENCOUNTER — OUTSIDE SERVICES (OUTPATIENT)
Age: 88
End: 2024-12-30
Payer: MEDICARE

## 2024-12-30 DIAGNOSIS — S91.209S AVULSION OF TOENAIL, SEQUELA: Primary | ICD-10-CM

## 2024-12-30 DIAGNOSIS — L98.9 SKIN LESION OF HAND: ICD-10-CM

## 2024-12-30 DIAGNOSIS — D64.9 ANEMIA, UNSPECIFIED TYPE: ICD-10-CM

## 2024-12-30 ASSESSMENT — ENCOUNTER SYMPTOMS
BACK PAIN: 1
COLOR CHANGE: 1

## 2024-12-31 NOTE — PROGRESS NOTES
SUBJECTIVE:    Patient ID: Robel Newell is a 89 y.o. male.    Chief Complaint   Patient presents with    Nail Problem    Skin Problem    Anemia       HPI: nursing home visit  Seeing him today at the nursing home due to his right toenail is trying to come off.  He is having some discomfort in it.  There is no significant redness warmth or tenderness.  He does feel like it is a little sore.  He says he is not sure why the nail is coming loose.  He has skin cancer lesions that the dermatologist has worked on do look better.  He is feeling like they are healing well.  He is also doing somewhat better with his anemia.  His iron infusions do seem to be helping.  His blood count seems to be doing well.  He has not had any recent falls or injuries.  He is not having any chest pain or shortness of breath.    Review of Systems   Constitutional:  Positive for fatigue.   Musculoskeletal:  Positive for arthralgias, back pain and gait problem.   Skin:  Positive for color change and wound.   Neurological:  Positive for weakness.   All other systems reviewed and are negative.       OBJECTIVE:  There were no vitals taken for this visit.   Wt Readings from Last 3 Encounters:   08/12/24 87.1 kg (192 lb)   07/17/24 88 kg (194 lb)     BP Readings from Last 3 Encounters:   08/12/24 119/67   08/01/24 (!) 98/53   07/29/24 108/64      Pulse Readings from Last 3 Encounters:   08/12/24 88   08/01/24 74   07/29/24 96     There is no height or weight on file to calculate BMI.   Resp Readings from Last 3 Encounters:   08/12/24 18   08/01/24 18   07/29/24 18     Past medical, surgical, family and social history were reviewed and updated with the patient.     Physical Exam  Vitals and nursing note reviewed.   Constitutional:       Appearance: He is well-developed and overweight.   HENT:      Head: Normocephalic and atraumatic.      Right Ear: External ear normal. Decreased hearing noted.      Left Ear: External ear normal. Decreased hearing

## 2025-02-01 ENCOUNTER — OUTSIDE SERVICES (OUTPATIENT)
Age: 89
End: 2025-02-01

## 2025-02-01 VITALS
TEMPERATURE: 97.2 F | OXYGEN SATURATION: 98 % | RESPIRATION RATE: 18 BRPM | HEART RATE: 79 BPM | HEIGHT: 70 IN | BODY MASS INDEX: 27.77 KG/M2 | SYSTOLIC BLOOD PRESSURE: 111 MMHG | WEIGHT: 194 LBS | DIASTOLIC BLOOD PRESSURE: 72 MMHG

## 2025-02-01 DIAGNOSIS — F41.9 ANXIETY: ICD-10-CM

## 2025-02-01 DIAGNOSIS — E78.2 MIXED HYPERLIPIDEMIA: ICD-10-CM

## 2025-02-01 DIAGNOSIS — K21.9 GASTROESOPHAGEAL REFLUX DISEASE WITHOUT ESOPHAGITIS: ICD-10-CM

## 2025-02-01 DIAGNOSIS — E11.8 DIABETES MELLITUS TYPE 2 WITH COMPLICATIONS (HCC): Primary | ICD-10-CM

## 2025-02-01 DIAGNOSIS — Z95.0 ARTIFICIAL CARDIAC PACEMAKER: ICD-10-CM

## 2025-02-01 DIAGNOSIS — I10 PRIMARY HYPERTENSION: ICD-10-CM

## 2025-02-01 DIAGNOSIS — Z86.73 HISTORY OF CVA IN ADULTHOOD: ICD-10-CM

## 2025-02-01 DIAGNOSIS — G81.94 LEFT HEMIPARESIS (HCC): ICD-10-CM

## 2025-02-01 ASSESSMENT — ENCOUNTER SYMPTOMS
CONSTIPATION: 1
RESPIRATORY NEGATIVE: 1
BACK PAIN: 1
EYES NEGATIVE: 1

## 2025-02-01 NOTE — PROGRESS NOTES
SUBJECTIVE:  Robel Newell is a 89 y.o. male that presents with   Chief Complaint   Patient presents with    Diabetes    Hypertension    history stroke   .    HPI:    This is an 89-year-old white male [facility]  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  He has had multiple falls 1 causing multiple rib fractures he does have chronic pain.  Today they are getting ready to go to a birthday party he is in a wheelchair he makes good eye contact this pleasant smiles tells me that he is tired staff says he does get a bit anxious sometimes.  His medications are amiodarone 200 mg daily Amitiza 24 mcg twice daily aspirin 81 mg Brovana nebulizer 15 mcg twice daily budesonide 0.5 mg twice daily Colace 100 mg twice daily ferrous sulfate 325 mg twice daily glipizide 5 mg daily Jardiance 10 mg daily K-Dur 10 milliequivalents daily Synthroid 200 mcg daily lactulose 45 mg twice daily as needed Lasix 40 mg daily lisinopril 2.5 mg daily Xyzal 5 mg daily metformin 1000 mg twice daily sided tach 200 mg daily Protonix 40 mg twice daily MiraLAX 17 g daily as needed Zocor 40 mg at bedtime Carafate 1 g every 6 hours Flomax 0.4 daily Tylenol as needed vitamin C daily Voltaren gel as needed.  He says that he is taking all of his medications and having no problems with them he is tolerating them well he says that he eats when he is hungry he has no complaints with that.  He does have some problems with constipation at times but he is on Amitiza regularly for that sugar has been controlled and we monitor his hemoglobin hematocrit frequently.  Vital signs today have been good and staff

## 2025-03-01 ENCOUNTER — OUTSIDE SERVICES (OUTPATIENT)
Age: 89
End: 2025-03-01

## 2025-03-01 ASSESSMENT — PATIENT HEALTH QUESTIONNAIRE - PHQ9
SUM OF ALL RESPONSES TO PHQ QUESTIONS 1-9: 0
2. FEELING DOWN, DEPRESSED OR HOPELESS: NOT AT ALL
SUM OF ALL RESPONSES TO PHQ QUESTIONS 1-9: 0
1. LITTLE INTEREST OR PLEASURE IN DOING THINGS: NOT AT ALL
SUM OF ALL RESPONSES TO PHQ9 QUESTIONS 1 & 2: 0
SUM OF ALL RESPONSES TO PHQ QUESTIONS 1-9: 0
SUM OF ALL RESPONSES TO PHQ QUESTIONS 1-9: 0

## 2025-03-18 ENCOUNTER — HOSPITAL ENCOUNTER (EMERGENCY)
Facility: HOSPITAL | Age: 89
Discharge: NURSING, REHAB OR LONG-TERM CARE | End: 2025-03-18
Attending: EMERGENCY MEDICINE
Payer: MEDICARE

## 2025-03-18 VITALS
HEIGHT: 70 IN | DIASTOLIC BLOOD PRESSURE: 54 MMHG | TEMPERATURE: 98.5 F | BODY MASS INDEX: 26.2 KG/M2 | RESPIRATION RATE: 15 BRPM | SYSTOLIC BLOOD PRESSURE: 100 MMHG | OXYGEN SATURATION: 97 % | WEIGHT: 183 LBS | HEART RATE: 75 BPM

## 2025-03-18 DIAGNOSIS — T14.8XXA MULTIPLE SKIN TEARS: Primary | ICD-10-CM

## 2025-03-18 LAB
ANION GAP SERPL CALCULATED.3IONS-SCNC: 14 MMOL/L (ref 3–16)
BASOPHILS # BLD: 0 K/UL (ref 0–0.1)
BASOPHILS NFR BLD: 0.6 %
BUN SERPL-MCNC: 24 MG/DL (ref 6–20)
CALCIUM SERPL-MCNC: 9 MG/DL (ref 8.3–10.6)
CHLORIDE SERPL-SCNC: 102 MMOL/L (ref 98–107)
CO2 SERPL-SCNC: 22 MMOL/L (ref 20–30)
CREAT SERPL-MCNC: 1.3 MG/DL (ref 0.4–1.2)
EOSINOPHIL # BLD: 0 K/UL (ref 0–0.4)
EOSINOPHIL NFR BLD: 0.6 %
ERYTHROCYTE [DISTWIDTH] IN BLOOD BY AUTOMATED COUNT: 21.2 % (ref 11–16)
FLUAV AG NPH QL: POSITIVE
FLUBV AG NPH QL: NEGATIVE
GFR SERPLBLD CREATININE-BSD FMLA CKD-EPI: 52 ML/MIN/{1.73_M2}
GLUCOSE BLD-MCNC: 133 MG/DL (ref 74–106)
GLUCOSE SERPL-MCNC: 97 MG/DL (ref 74–106)
HCT VFR BLD AUTO: 22.5 % (ref 40–54)
HGB BLD-MCNC: 7.1 G/DL (ref 13–18)
IMM GRANULOCYTES # BLD: 0 K/UL
IMM GRANULOCYTES NFR BLD: 0.7 % (ref 0–5)
INR PPP: 1.22 (ref 0.86–1.14)
LYMPHOCYTES # BLD: 0.4 K/UL (ref 1.5–4)
LYMPHOCYTES NFR BLD: 7.3 %
MCH RBC QN AUTO: 28.7 PG (ref 27–32)
MCHC RBC AUTO-ENTMCNC: 31.6 G/DL (ref 31–35)
MCV RBC AUTO: 91.1 FL (ref 80–100)
MONOCYTES # BLD: 0.6 K/UL (ref 0.2–0.8)
MONOCYTES NFR BLD: 11.9 %
NEUTROPHILS # BLD: 4.2 K/UL (ref 2–7.5)
NEUTS SEG NFR BLD: 78.9 %
PERFORMED ON: ABNORMAL
PLATELET # BLD AUTO: 198 K/UL (ref 150–400)
PMV BLD AUTO: 9.8 FL (ref 6–10)
POTASSIUM SERPL-SCNC: 3.4 MMOL/L (ref 3.4–5.1)
PROTHROMBIN TIME: 15.6 SEC (ref 12–14.1)
RBC # BLD AUTO: 2.47 M/UL (ref 4.5–6)
SODIUM SERPL-SCNC: 138 MMOL/L (ref 136–145)
WBC # BLD AUTO: 5.4 K/UL (ref 4–11)

## 2025-03-18 PROCEDURE — 96360 HYDRATION IV INFUSION INIT: CPT

## 2025-03-18 PROCEDURE — 96372 THER/PROPH/DIAG INJ SC/IM: CPT

## 2025-03-18 PROCEDURE — 85025 COMPLETE CBC W/AUTO DIFF WBC: CPT

## 2025-03-18 PROCEDURE — 80048 BASIC METABOLIC PNL TOTAL CA: CPT

## 2025-03-18 PROCEDURE — 36415 COLL VENOUS BLD VENIPUNCTURE: CPT

## 2025-03-18 PROCEDURE — 85610 PROTHROMBIN TIME: CPT

## 2025-03-18 PROCEDURE — 6360000002 HC RX W HCPCS: Performed by: EMERGENCY MEDICINE

## 2025-03-18 PROCEDURE — 2580000003 HC RX 258: Performed by: EMERGENCY MEDICINE

## 2025-03-18 PROCEDURE — 99284 EMERGENCY DEPT VISIT MOD MDM: CPT

## 2025-03-18 PROCEDURE — 87804 INFLUENZA ASSAY W/OPTIC: CPT

## 2025-03-18 RX ORDER — SODIUM CHLORIDE 9 MG/ML
INJECTION, SOLUTION INTRAVENOUS CONTINUOUS
Status: DISCONTINUED | OUTPATIENT
Start: 2025-03-18 | End: 2025-03-19 | Stop reason: HOSPADM

## 2025-03-18 RX ORDER — PHYTONADIONE 10 MG/ML
10 INJECTION, EMULSION INTRAMUSCULAR; INTRAVENOUS; SUBCUTANEOUS ONCE
Status: COMPLETED | OUTPATIENT
Start: 2025-03-18 | End: 2025-03-18

## 2025-03-18 RX ADMIN — SODIUM CHLORIDE: 9 INJECTION, SOLUTION INTRAVENOUS at 17:45

## 2025-03-18 RX ADMIN — PHYTONADIONE 10 MG: 10 INJECTION, EMULSION INTRAMUSCULAR; INTRAVENOUS; SUBCUTANEOUS at 19:22

## 2025-03-18 ASSESSMENT — PAIN - FUNCTIONAL ASSESSMENT: PAIN_FUNCTIONAL_ASSESSMENT: NONE - DENIES PAIN

## 2025-03-18 NOTE — ED PROVIDER NOTES
.        [facility]  EMERGENCY DEPARTMENT ENCOUNTER        Pt Name: Robel Newell  MRN: [id number]  Birthdate 8/12/1935  Date of evaluation: 3/18/2025  Provider: Margie Plaza MD  PCP: Rubia Babcock MD  Note Started: 5:50 PM EDT 3/18/25    CHIEF COMPLAINT       Chief Complaint   Patient presents with    Hypotension     Per [facility] report patient fell today at [facility]. Patient was seen at [facility] for Fall. Per report Patients blood pressure was low at the nursing home at 82/42.        HISTORY OF PRESENT ILLNESS: 1 or more Elements     History from : Patient    Limitations to history : None    Robel Newell is a 89 y.o. male who presents stating earlier this morning he was lying in bed used a grabber to reach for something at the fall onto the floor when he did he over reach and fell out of bed landing on his back hit the back of his head he was not knocked out he was sent to [place] where he had a CT scan of the head as well as hip x-rays all of which were negative for any significant injury.  He was transferred back to the nursing home after having dressing was placed on 3 large abrasions/skin tears on his upper back as well as the occipital area of his scalp.  He was then transferred here because he had lost quite a bit of blood when he actually fell this morning and this afternoon he began becoming hypotensive with blood blood pressures down into the upper 80s systolic low 90s systolic range.    Nursing Notes were all reviewed and agreed with or any disagreements were addressed in the HPI.    REVIEW OF SYSTEMS :      Review of Systems     systems reviewed and negative except as in HPI/MDM    SURGICAL HISTORY   History reviewed. No pertinent surgical history.    CURRENTMEDICATIONS       Previous Medications    ACETAMINOPHEN (TYLENOL) 500 MG TABLET    Take 1 tablet by mouth every 6 hours as needed for Pain    AMIODARONE (CORDARONE) 200 MG

## 2025-03-18 NOTE — ED NOTES
Cleaned open wounds and abrasions with saline. See pictures. Applied non adhesive dressings. Report given to Julio KEANE.

## 2025-03-18 NOTE — ED NOTES
Spoke with [facility] regarding transfer back to the [facility]. Dispatch advised they would send a unit as soon as one is available

## 2025-03-18 NOTE — ED TRIAGE NOTES
Received this 89 year old male from [facility]. Per Report patient had a fall out of his bed this am. Patient injured back of the head, multiple abrasion, bruises, skin tears on LUE, RUE, and left shoulder. Per report from [facility] patient was then took to the [facility]. Patient was discharged from [place] ER back to the NH. Per nurse from [facility] patients BP was low 82/42. Patient arrived alert and oriented X 4 and no C/O pain or active bleeding noted from wounds. Dr. Plaza aware of report and patients status.

## 2025-03-21 ENCOUNTER — HOSPITAL ENCOUNTER (EMERGENCY)
Facility: HOSPITAL | Age: 89
Discharge: OTHER INSTITUTION | End: 2025-03-21
Attending: EMERGENCY MEDICINE
Payer: MEDICARE

## 2025-03-21 VITALS
WEIGHT: 183 LBS | HEIGHT: 70 IN | RESPIRATION RATE: 14 BRPM | DIASTOLIC BLOOD PRESSURE: 61 MMHG | BODY MASS INDEX: 26.2 KG/M2 | TEMPERATURE: 98.8 F | HEART RATE: 75 BPM | OXYGEN SATURATION: 100 % | SYSTOLIC BLOOD PRESSURE: 119 MMHG

## 2025-03-21 DIAGNOSIS — D64.9 ANEMIA, UNSPECIFIED TYPE: Primary | ICD-10-CM

## 2025-03-21 LAB
ALBUMIN SERPL-MCNC: 3.4 G/DL (ref 3.4–4.8)
ALBUMIN/GLOB SERPL: 1.5 {RATIO} (ref 0.8–2)
ALP SERPL-CCNC: 94 U/L (ref 25–100)
ALT SERPL-CCNC: 17 U/L (ref 4–36)
ANION GAP SERPL CALCULATED.3IONS-SCNC: 11 MMOL/L (ref 3–16)
AST SERPL-CCNC: 21 U/L (ref 8–33)
BASOPHILS # BLD: 0 K/UL (ref 0–0.1)
BASOPHILS NFR BLD: 0.5 %
BILIRUB SERPL-MCNC: 0.3 MG/DL (ref 0.3–1.2)
BILIRUB UR QL STRIP.AUTO: NEGATIVE
BUN SERPL-MCNC: 24 MG/DL (ref 6–20)
CALCIUM SERPL-MCNC: 8.9 MG/DL (ref 8.3–10.6)
CHLORIDE SERPL-SCNC: 104 MMOL/L (ref 98–107)
CLARITY UR: CLEAR
CO2 SERPL-SCNC: 23 MMOL/L (ref 20–30)
COLOR UR: YELLOW
CREAT SERPL-MCNC: 1.1 MG/DL (ref 0.4–1.2)
EOSINOPHIL # BLD: 0.1 K/UL (ref 0–0.4)
EOSINOPHIL NFR BLD: 2.5 %
ERYTHROCYTE [DISTWIDTH] IN BLOOD BY AUTOMATED COUNT: 20.3 % (ref 11–16)
FLUAV AG NPH QL: POSITIVE
FLUBV AG NPH QL: NEGATIVE
GFR SERPLBLD CREATININE-BSD FMLA CKD-EPI: 64 ML/MIN/{1.73_M2}
GLOBULIN SER CALC-MCNC: 2.2 G/DL
GLUCOSE SERPL-MCNC: 75 MG/DL (ref 74–106)
GLUCOSE UR STRIP.AUTO-MCNC: 500 MG/DL
HCT VFR BLD AUTO: 23.8 % (ref 40–54)
HGB BLD-MCNC: 7.2 G/DL (ref 13–18)
HGB UR QL STRIP.AUTO: NEGATIVE
IMM GRANULOCYTES # BLD: 0 K/UL
IMM GRANULOCYTES NFR BLD: 0.5 % (ref 0–5)
INR PPP: 1.04 (ref 0.86–1.14)
KETONES UR STRIP.AUTO-MCNC: NEGATIVE MG/DL
LEUKOCYTE ESTERASE UR QL STRIP.AUTO: NEGATIVE
LYMPHOCYTES # BLD: 0.7 K/UL (ref 1.5–4)
LYMPHOCYTES NFR BLD: 15.2 %
MCH RBC QN AUTO: 27.9 PG (ref 27–32)
MCHC RBC AUTO-ENTMCNC: 30.3 G/DL (ref 31–35)
MCV RBC AUTO: 92.2 FL (ref 80–100)
MONOCYTES # BLD: 0.5 K/UL (ref 0.2–0.8)
MONOCYTES NFR BLD: 10.6 %
NEUTROPHILS # BLD: 3.1 K/UL (ref 2–7.5)
NEUTS SEG NFR BLD: 70.7 %
NITRITE UR QL STRIP.AUTO: NEGATIVE
PH UR STRIP.AUTO: 5 [PH] (ref 5–8)
PLATELET # BLD AUTO: 220 K/UL (ref 150–400)
PMV BLD AUTO: 9.5 FL (ref 6–10)
POTASSIUM SERPL-SCNC: 4 MMOL/L (ref 3.4–5.1)
PROT SERPL-MCNC: 5.6 G/DL (ref 6.4–8.3)
PROT UR STRIP.AUTO-MCNC: NEGATIVE MG/DL
PROTHROMBIN TIME: 13.9 SEC (ref 12–14.1)
RBC # BLD AUTO: 2.58 M/UL (ref 4.5–6)
SODIUM SERPL-SCNC: 138 MMOL/L (ref 136–145)
SP GR UR STRIP.AUTO: 1.01 (ref 1–1.03)
UA COMPLETE W REFLEX CULTURE PNL UR: ABNORMAL
UA DIPSTICK W REFLEX MICRO PNL UR: ABNORMAL
URN SPEC COLLECT METH UR: ABNORMAL
UROBILINOGEN UR STRIP-ACNC: 0.2 E.U./DL
WBC # BLD AUTO: 4.4 K/UL (ref 4–11)

## 2025-03-21 PROCEDURE — 85610 PROTHROMBIN TIME: CPT

## 2025-03-21 PROCEDURE — 99284 EMERGENCY DEPT VISIT MOD MDM: CPT

## 2025-03-21 PROCEDURE — 36415 COLL VENOUS BLD VENIPUNCTURE: CPT

## 2025-03-21 PROCEDURE — 85025 COMPLETE CBC W/AUTO DIFF WBC: CPT

## 2025-03-21 PROCEDURE — 87804 INFLUENZA ASSAY W/OPTIC: CPT

## 2025-03-21 PROCEDURE — 81003 URINALYSIS AUTO W/O SCOPE: CPT

## 2025-03-21 PROCEDURE — 80053 COMPREHEN METABOLIC PANEL: CPT

## 2025-03-21 RX ORDER — ACETAMINOPHEN 325 MG/1
650 TABLET ORAL ONCE
OUTPATIENT
Start: 2025-03-21 | End: 2025-03-21

## 2025-03-21 RX ORDER — DIPHENHYDRAMINE HYDROCHLORIDE 50 MG/ML
50 INJECTION, SOLUTION INTRAMUSCULAR; INTRAVENOUS
OUTPATIENT
Start: 2025-03-21

## 2025-03-21 RX ORDER — SODIUM CHLORIDE 9 MG/ML
20 INJECTION, SOLUTION INTRAVENOUS CONTINUOUS
OUTPATIENT
Start: 2025-03-21

## 2025-03-21 RX ORDER — ACETAMINOPHEN 325 MG/1
650 TABLET ORAL
OUTPATIENT
Start: 2025-03-21

## 2025-03-21 RX ORDER — ONDANSETRON 2 MG/ML
8 INJECTION INTRAMUSCULAR; INTRAVENOUS
OUTPATIENT
Start: 2025-03-21

## 2025-03-21 RX ORDER — HYDROCORTISONE SODIUM SUCCINATE 100 MG/2ML
100 INJECTION INTRAMUSCULAR; INTRAVENOUS
OUTPATIENT
Start: 2025-03-21

## 2025-03-21 RX ORDER — SODIUM CHLORIDE 9 MG/ML
25 INJECTION, SOLUTION INTRAVENOUS PRN
OUTPATIENT
Start: 2025-03-21

## 2025-03-21 RX ORDER — SODIUM CHLORIDE 9 MG/ML
INJECTION, SOLUTION INTRAVENOUS CONTINUOUS
OUTPATIENT
Start: 2025-03-21

## 2025-03-21 RX ORDER — EPINEPHRINE 1 MG/ML
0.3 INJECTION, SOLUTION INTRAMUSCULAR; SUBCUTANEOUS PRN
OUTPATIENT
Start: 2025-03-21

## 2025-03-21 RX ORDER — DIPHENHYDRAMINE HCL 25 MG
25 CAPSULE ORAL ONCE
OUTPATIENT
Start: 2025-03-21 | End: 2025-03-21

## 2025-03-21 RX ORDER — ALBUTEROL SULFATE 0.83 MG/ML
2.5 SOLUTION RESPIRATORY (INHALATION)
OUTPATIENT
Start: 2025-03-21

## 2025-03-21 RX ORDER — SODIUM CHLORIDE 0.9 % (FLUSH) 0.9 %
5-40 SYRINGE (ML) INJECTION PRN
OUTPATIENT
Start: 2025-03-21

## 2025-03-21 ASSESSMENT — LIFESTYLE VARIABLES
HOW MANY STANDARD DRINKS CONTAINING ALCOHOL DO YOU HAVE ON A TYPICAL DAY: PATIENT DOES NOT DRINK
HOW OFTEN DO YOU HAVE A DRINK CONTAINING ALCOHOL: NEVER

## 2025-03-21 NOTE — ED NOTES
Pt rang out to use the urinal.  Assisted to position the urinal in place.  He will call out when he is done.

## 2025-03-21 NOTE — ED NOTES
Call [facility] to have a patient return back to [facility]. Spoke with Tatiana.

## 2025-03-21 NOTE — ED NOTES
PT states that he was hungry and wanted a snack, I got pt some nabs and a diet morenita. Pt tolerated well.

## 2025-03-21 NOTE — ED NOTES
Called [facility] to  this patient,again after 3 attempts and no answer at EMS.   Called [facility]patch . They informed me that they have had trucks out all day and have it noted still to  and return.  I asked if they have mutual aide? They stated that they usually call [place].  Spoke to Meaghan.   warm

## 2025-03-21 NOTE — ED NOTES
Called the [facility] facility, gave report to ISABEL Fink. Told her that pt hgb was 7.1 and did not require a blood transfusion and that they would be going back to facility

## 2025-03-21 NOTE — ED PROVIDER NOTES
.        [facility]  EMERGENCY DEPARTMENT ENCOUNTER        Pt Name: Robel Newell  MRN: [id number]  Birthdate 8/12/1935  Date of evaluation: 3/21/2025  Provider: Margie Plaza MD  PCP: Rubia Babcock MD  Note Started: 1:51 PM EDT 3/21/25    CHIEF COMPLAINT       Chief Complaint   Patient presents with    low Hbg     Patient arrived via [facility] from [facility] for Hbg 6.7.         HISTORY OF PRESENT ILLNESS: 1 or more Elements     History from : Patient    Limitations to history : None    Robel Newell is a 89 y.o. male who presents complaining of low hemoglobin needing a transfusion he just feels overall weak he is still passing dark blood in his stool has been constipated denies any abdominal pain denies nausea or vomiting he has had a fair amount of blood loss this week from multiple skin tears from a fall that he had 3 days ago.    Nursing Notes were all reviewed and agreed with or any disagreements were addressed in the HPI.    REVIEW OF SYSTEMS :      Review of Systems     systems reviewed and negative except as in HPI/MDM    SURGICAL HISTORY   History reviewed. No pertinent surgical history.    CURRENTMEDICATIONS       Previous Medications    ACETAMINOPHEN (TYLENOL) 500 MG TABLET    Take 1 tablet by mouth every 6 hours as needed for Pain    AMIODARONE (CORDARONE) 200 MG TABLET    Take 1 tablet by mouth daily    ARFORMOTEROL TARTRATE (BROVANA) 15 MCG/2ML NEBU    Take 1 ampule by nebulization 2 times daily    ASPIRIN 81 MG EC TABLET    Take 1 tablet by mouth daily    BUDESONIDE (PULMICORT) 0.5 MG/2ML NEBULIZER SUSPENSION    Take 2 mLs by nebulization 2 times daily    CARBOXYMETHYLCELLULOSE 1 % OPHTHALMIC SOLUTION    Place 1 drop into both eyes 3 times daily    DOCUSATE SODIUM (COLACE) 100 MG CAPSULE    Take 1 capsule by mouth 2 times daily    EMPAGLIFLOZIN (JARDIANCE) 10 MG TABLET    Take 1 tablet by mouth daily    FERROUS SULFATE (IRON 325) 325 (65 FE) MG  TABLET    Take 1 tablet by mouth daily (with breakfast)    FUROSEMIDE (LASIX) 40 MG TABLET    Take 1 tablet by mouth daily    GLIPIZIDE (GLUCOTROL) 5 MG TABLET    Take 1 tablet by mouth daily    LACTULOSE (CHRONULAC) 10 GM/15ML SOLUTION    Take 30 mLs by mouth 2 times daily    LEVOCETIRIZINE (XYZAL) 5 MG TABLET    Take 1 tablet by mouth nightly    LEVOTHYROXINE (SYNTHROID) 200 MCG TABLET    Take 1 tablet by mouth Daily    LISINOPRIL (PRINIVIL;ZESTRIL) 2.5 MG TABLET    Take 1 tablet by mouth daily    LUBIPROSTONE (AMITIZA) 24 MCG CAPSULE    Take 1 capsule by mouth 2 times daily (with meals)    METFORMIN (GLUCOPHAGE) 1000 MG TABLET    Take 1 tablet by mouth 2 times daily (with meals)    MISOPROSTOL (CYTOTEC) 200 MCG TABLET    Take 1 tablet by mouth once    PANTOPRAZOLE (PROTONIX) 40 MG TABLET    Take 1 tablet by mouth daily    POLYETHYLENE GLYCOL (GLYCOLAX) 17 GM/SCOOP POWDER    Take 17 g by mouth daily    POTASSIUM CHLORIDE (MICRO-K) 10 MEQ EXTENDED RELEASE CAPSULE    Take 1 capsule by mouth daily    SIMVASTATIN (ZOCOR) 40 MG TABLET    Take 1 tablet by mouth nightly    SUCRALFATE (CARAFATE) 1 GM TABLET    Take 1 tablet by mouth 4 times daily    VITAMIN C (ASCORBIC ACID) 500 MG TABLET    Take 1 tablet by mouth daily       ALLERGIES     Pcn [penicillins] and Sulfa antibiotics    FAMILYHISTORY     History reviewed. No pertinent family history.     SOCIAL HISTORY       Social History     Tobacco Use    Smoking status: Never    Smokeless tobacco: Never   Substance Use Topics    Alcohol use: Not Currently    Drug use: Not Currently       SCREENINGS        [place] Coma Scale  Eye Opening: Spontaneous  Best Verbal Response: Oriented  Best Motor Response: Obeys commands  [place] Coma Scale Score: 15                [place] Assessment  BP: (!) 111/54  Pulse: 75           PHYSICAL EXAM  1 or more Elements     ED Triage Vitals [03/21/25 1337]   BP Systolic BP Percentile Diastolic BP Percentile Temp Temp Source Pulse Respirations

## 2025-04-18 ENCOUNTER — OUTSIDE SERVICES (OUTPATIENT)
Age: 89
End: 2025-04-18
Payer: MEDICARE

## 2025-04-18 DIAGNOSIS — D50.0 IRON DEFICIENCY ANEMIA DUE TO CHRONIC BLOOD LOSS: ICD-10-CM

## 2025-04-18 DIAGNOSIS — L03.032 CELLULITIS OF TOE OF LEFT FOOT: Primary | ICD-10-CM

## 2025-04-18 DIAGNOSIS — G81.94 LEFT HEMIPARESIS (HCC): ICD-10-CM

## 2025-04-18 DIAGNOSIS — F41.9 ANXIETY: ICD-10-CM

## 2025-04-18 PROCEDURE — 99308 SBSQ NF CARE LOW MDM 20: CPT | Performed by: FAMILY MEDICINE

## 2025-04-18 NOTE — PROGRESS NOTES
Patient arrives to the ER from his PCP office with reports of cough, high BP, chest heaviness and SOB. Reports he was just hospitalized for pneumonia. Denies fevers, N/V/D.    SUBJECTIVE:    Patient ID: Robel Newell is a 89 y.o. male.    Chief Complaint   Patient presents with    Anemia     Monitoring hemoglobin weekly.     Extremity Weakness    Skin Lesion     Redness and some tenderness on his left 2nd toe       HPI: nursing home visit  He is havign some redness and swelling in his left 2nd toe.  He is worried about it.  He denies fever, chills, nausea or vomiting.  He is not havign any chest pain or increase in shortness of breath.  He is having good blood pressures.  His hemoglobin is holding pretty well.  He has not had any recent blood or iron infusions.  He is feeling like he is doing pretty well with making some blood.  He is frustrated with his functional ability.  He has not had any recent falls or injuries.    Review of Systems   Constitutional:  Positive for fatigue.   Musculoskeletal:  Positive for arthralgias, back pain and gait problem.   Skin:  Positive for color change and wound.   Neurological:  Positive for weakness.   All other systems reviewed and are negative.       OBJECTIVE:  There were no vitals taken for this visit.   Wt Readings from Last 3 Encounters:   03/21/25 83 kg (183 lb)   03/18/25 83 kg (183 lb)   01/31/25 88 kg (194 lb)     BP Readings from Last 3 Encounters:   03/21/25 119/61   03/18/25 (!) 100/54   01/31/25 111/72      Pulse Readings from Last 3 Encounters:   03/21/25 75   03/18/25 75   01/31/25 79     There is no height or weight on file to calculate BMI.   Resp Readings from Last 3 Encounters:   03/21/25 14   03/18/25 15   01/31/25 18     Past medical, surgical, family and social history were reviewed and updated with the patient.     Physical Exam  Vitals and nursing note reviewed.   Constitutional:       Appearance: He is well-developed and overweight.   HENT:      Head: Normocephalic and atraumatic.      Right Ear: External ear normal. Decreased hearing noted.      Left Ear: External ear normal. Decreased hearing noted.      Nose: Nose

## 2025-04-21 PROBLEM — L03.032 CELLULITIS OF TOE OF LEFT FOOT: Status: ACTIVE | Noted: 2025-04-21

## 2025-04-21 ASSESSMENT — ENCOUNTER SYMPTOMS
BACK PAIN: 1
COLOR CHANGE: 1

## 2025-04-23 DIAGNOSIS — B02.30 HERPES ZOSTER WITH OPHTHALMIC COMPLICATION, UNSPECIFIED HERPES ZOSTER EYE DISEASE: Primary | ICD-10-CM

## 2025-04-23 RX ORDER — GABAPENTIN 100 MG/1
100 CAPSULE ORAL 3 TIMES DAILY
Qty: 90 CAPSULE | Refills: 0 | Status: SHIPPED | OUTPATIENT
Start: 2025-04-23 | End: 2025-05-23

## 2025-05-08 DIAGNOSIS — B02.39 OTHER HERPES ZOSTER EYE DISEASE: Primary | ICD-10-CM

## 2025-05-08 RX ORDER — PREGABALIN 25 MG/1
25 CAPSULE ORAL 3 TIMES DAILY
Qty: 90 CAPSULE | Refills: 0 | Status: SHIPPED | OUTPATIENT
Start: 2025-05-08 | End: 2025-06-07

## 2025-05-09 DIAGNOSIS — G89.29 OTHER CHRONIC PAIN: ICD-10-CM

## 2025-05-09 RX ORDER — OXYCODONE HYDROCHLORIDE 5 MG/1
5 TABLET ORAL EVERY 6 HOURS PRN
Qty: 12 TABLET | Refills: 0 | Status: SHIPPED | OUTPATIENT
Start: 2025-05-09 | End: 2025-05-12

## 2025-05-12 DIAGNOSIS — G89.29 OTHER CHRONIC PAIN: ICD-10-CM

## 2025-05-12 RX ORDER — OXYCODONE HYDROCHLORIDE 5 MG/1
5 TABLET ORAL EVERY 6 HOURS PRN
Qty: 120 TABLET | Refills: 0 | Status: SHIPPED | OUTPATIENT
Start: 2025-05-12 | End: 2025-06-11

## 2025-05-19 ENCOUNTER — HOSPITAL ENCOUNTER (OUTPATIENT)
Facility: HOSPITAL | Age: 89
Discharge: HOME | End: 2025-05-19

## 2025-05-19 LAB
BILIRUB UR QL STRIP.AUTO: NEGATIVE
CLARITY UR: CLEAR
COLOR UR: YELLOW
GLUCOSE UR STRIP.AUTO-MCNC: 500 MG/DL
HGB UR QL STRIP.AUTO: NEGATIVE
KETONES UR STRIP.AUTO-MCNC: NEGATIVE MG/DL
LEUKOCYTE ESTERASE UR QL STRIP.AUTO: NEGATIVE
NITRITE UR QL STRIP.AUTO: NEGATIVE
PH UR STRIP.AUTO: 5.5 [PH] (ref 5–8)
PROT UR STRIP.AUTO-MCNC: NEGATIVE MG/DL
SP GR UR STRIP.AUTO: 1.01 (ref 1–1.03)
UA DIPSTICK W REFLEX MICRO PNL UR: ABNORMAL
URN SPEC COLLECT METH UR: ABNORMAL
UROBILINOGEN UR STRIP-ACNC: 0.2 E.U./DL

## 2025-05-19 PROCEDURE — 87086 URINE CULTURE/COLONY COUNT: CPT

## 2025-05-19 PROCEDURE — 81003 URINALYSIS AUTO W/O SCOPE: CPT

## 2025-05-20 LAB — BACTERIA UR CULT: NORMAL

## 2025-05-23 ENCOUNTER — OUTSIDE SERVICES (OUTPATIENT)
Age: 89
End: 2025-05-23
Payer: MEDICARE

## 2025-05-23 DIAGNOSIS — G81.94 LEFT HEMIPARESIS (HCC): ICD-10-CM

## 2025-05-23 DIAGNOSIS — L98.9 SKIN LESIONS: ICD-10-CM

## 2025-05-23 DIAGNOSIS — D50.0 IRON DEFICIENCY ANEMIA DUE TO CHRONIC BLOOD LOSS: Primary | ICD-10-CM

## 2025-05-23 DIAGNOSIS — B02.31 HERPES ZOSTER CONJUNCTIVITIS: ICD-10-CM

## 2025-05-23 DIAGNOSIS — F41.9 ANXIETY: ICD-10-CM

## 2025-05-23 DIAGNOSIS — R53.1 WEAKNESS: ICD-10-CM

## 2025-05-23 PROCEDURE — 99309 SBSQ NF CARE MODERATE MDM 30: CPT | Performed by: FAMILY MEDICINE

## 2025-05-29 ASSESSMENT — ENCOUNTER SYMPTOMS
BACK PAIN: 1
COLOR CHANGE: 1

## 2025-05-30 NOTE — PROGRESS NOTES
SUBJECTIVE:    Patient ID: Robel Newell is a 89 y.o. male.    Chief Complaint   Patient presents with    Extremity Weakness     progressive    Skin Problem    Anemia       HPI: nursing home visit    Visiting him with today in follow-up of his progressive weakness.  He still struggling quite a bit to be able to tolerate the pain from the shingles.  He is having some increased sleepiness due to the Lyrica.  He is having some increase in anxiety.  His wife said his side today.  His anemia seems to be relatively stable with his iron.  He is having more issues with his skin.  He is had several lesions removed recently.  1 of which did have MRSA cultured out of it.  He is struggling to eat.  He is sleeping a lot.  His wife is concerned.  He does seem to be a little confused at times.  She is concerned that he is getting some sort of dementia.  Review of Systems   Constitutional:  Positive for fatigue.   Musculoskeletal:  Positive for arthralgias, back pain and gait problem.   Skin:  Positive for color change and wound.   Neurological:  Positive for weakness.   All other systems reviewed and are negative.       OBJECTIVE:  There were no vitals taken for this visit.   Wt Readings from Last 3 Encounters:   03/21/25 83 kg (183 lb)   03/18/25 83 kg (183 lb)   01/31/25 88 kg (194 lb)     BP Readings from Last 3 Encounters:   03/21/25 119/61   03/18/25 (!) 100/54   01/31/25 111/72      Pulse Readings from Last 3 Encounters:   03/21/25 75   03/18/25 75   01/31/25 79     There is no height or weight on file to calculate BMI.   Resp Readings from Last 3 Encounters:   03/21/25 14   03/18/25 15   01/31/25 18     Past medical, surgical, family and social history were reviewed and updated with the patient.     Physical Exam  Vitals and nursing note reviewed.   Constitutional:       Appearance: He is well-developed and overweight.   HENT:      Head: Normocephalic and atraumatic.      Right Ear: External ear normal. Decreased